# Patient Record
Sex: FEMALE | Race: WHITE | Employment: PART TIME | URBAN - METROPOLITAN AREA
[De-identification: names, ages, dates, MRNs, and addresses within clinical notes are randomized per-mention and may not be internally consistent; named-entity substitution may affect disease eponyms.]

---

## 2023-04-10 LAB
GRAM STAIN: NORMAL
TISSUE/WOUND CULTURE/SMEAR: NORMAL

## 2023-04-27 ENCOUNTER — HOSPITAL ENCOUNTER (OUTPATIENT)
Dept: DATA CONVERSION | Facility: HOSPITAL | Age: 18
End: 2023-04-27
Attending: OTOLARYNGOLOGY | Admitting: OTOLARYNGOLOGY
Payer: COMMERCIAL

## 2023-04-27 DIAGNOSIS — R22.1 LOCALIZED SWELLING, MASS AND LUMP, NECK: ICD-10-CM

## 2023-04-27 DIAGNOSIS — L72.0 EPIDERMAL CYST: ICD-10-CM

## 2023-04-27 DIAGNOSIS — F17.200 NICOTINE DEPENDENCE, UNSPECIFIED, UNCOMPLICATED: ICD-10-CM

## 2023-04-27 DIAGNOSIS — L02.11 CUTANEOUS ABSCESS OF NECK: ICD-10-CM

## 2023-04-27 DIAGNOSIS — L72.8 OTHER FOLLICULAR CYSTS OF THE SKIN AND SUBCUTANEOUS TISSUE: ICD-10-CM

## 2023-04-27 LAB — HCG, URINE: NEGATIVE

## 2023-04-29 LAB
GRAM STAIN: NORMAL
TISSUE/WOUND CULTURE/SMEAR: NORMAL

## 2023-05-04 LAB
COMPLETE PATHOLOGY REPORT: NORMAL
CONVERTED CLINICAL DIAGNOSIS-HISTORY: NORMAL
CONVERTED FINAL DIAGNOSIS: NORMAL
CONVERTED FINAL REPORT PDF LINK TO COPY AND PASTE: NORMAL
CONVERTED GROSS DESCRIPTION: NORMAL

## 2023-05-25 ENCOUNTER — HOSPITAL ENCOUNTER (OUTPATIENT)
Dept: DATA CONVERSION | Facility: HOSPITAL | Age: 18
End: 2023-05-25
Attending: OTOLARYNGOLOGY | Admitting: OTOLARYNGOLOGY
Payer: COMMERCIAL

## 2023-05-25 DIAGNOSIS — Z53.8 PROCEDURE AND TREATMENT NOT CARRIED OUT FOR OTHER REASONS: ICD-10-CM

## 2023-05-25 DIAGNOSIS — R22.1 LOCALIZED SWELLING, MASS AND LUMP, NECK: ICD-10-CM

## 2023-05-25 LAB — HCG, URINE: POSITIVE

## 2023-06-09 LAB
CHLAMYDIA TRACH., AMPLIFIED: NEGATIVE
N. GONORRHEA, AMPLIFIED: NEGATIVE
URINE CULTURE: NORMAL

## 2023-07-05 LAB
ERYTHROCYTE DISTRIBUTION WIDTH (RATIO) BY AUTOMATED COUNT: 14.2 % (ref 11.5–14.5)
ERYTHROCYTE MEAN CORPUSCULAR HEMOGLOBIN CONCENTRATION (G/DL) BY AUTOMATED: 33 G/DL (ref 32–36)
ERYTHROCYTE MEAN CORPUSCULAR VOLUME (FL) BY AUTOMATED COUNT: 85 FL (ref 80–100)
ERYTHROCYTES (10*6/UL) IN BLOOD BY AUTOMATED COUNT: 4.73 X10E12/L (ref 4–5.2)
HEMATOCRIT (%) IN BLOOD BY AUTOMATED COUNT: 40.3 % (ref 36–46)
HEMOGLOBIN (G/DL) IN BLOOD: 13.3 G/DL (ref 12–16)
LEUKOCYTES (10*3/UL) IN BLOOD BY AUTOMATED COUNT: 10 X10E9/L (ref 4.4–11.3)
PLATELETS (10*3/UL) IN BLOOD AUTOMATED COUNT: 282 X10E9/L (ref 150–450)
REFLEX ADDED, ANEMIA PANEL: NORMAL

## 2023-07-06 LAB
ABO GROUP (TYPE) IN BLOOD: NORMAL
ANTIBODY SCREEN: NORMAL
ESTIMATED AVERAGE GLUCOSE FOR HBA1C: 88 MG/DL
HEMOGLOBIN A1C/HEMOGLOBIN TOTAL IN BLOOD: 4.7 %
HEPATITIS B VIRUS SURFACE AG PRESENCE IN SERUM: NONREACTIVE
HIV 1/ 2 AG/AB SCREEN: NONREACTIVE
RH FACTOR: NORMAL
RUBELLA VIRUS IGG AB: NORMAL
SYPHILIS TOTAL AB: NONREACTIVE

## 2023-09-07 VITALS — WEIGHT: 197.31 LBS | BODY MASS INDEX: 33.69 KG/M2 | HEIGHT: 64 IN

## 2023-09-14 NOTE — H&P
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  RESPIRATION: Breathing comfortably, no stridor.  CV: No clubbing/cyanosis/edema in hands.  EYES: EOM Intact, sclera normal.  NEURO: Alert and oriented times 3, Cranial nerves 2-12 intact and symmetric bilaterally.  HEAD AND FACE: Skin with no masses or lesions, sinuses nontender to palpation.  SALIVARY GLANDS: Parotid and submandibular glands normal bilaterally.  EARS: Normal external ears, external auditory canals, and TMs to otoscopy, normal hearing to whispered voice.  NOSE: External nose midline, anterior rhinoscopy is normal with limited visualization to the anterior aspect of the interior turbinates, no lesions noted.  ORAL CAVITY/OROPHARYNX/LIPS: Normal mucous membranes, normal floor of mouth/tongue/OP, no masses or lesions are noted.  NECK/LYMPH: No LAD, no thyroid masses. palpable right neck mass   Respiratory/Thorax: breathing comfortably   Cardiovascular: regular rate     Consent:   COVID-19 Consent:  ·  COVID-19 Risk Consent Surgeon has reviewed key risks related to the risk of julia COVID-19 and if they contract COVID-19 what the risks are.       Electronic Signatures:  Terrance Christy)  (Signed 27-Apr-2023 08:39)   Authored: History of Present Illness, Allergies, Home  Medication Review, Impression/Procedure,  ERAS, Physical Exam, Consent, Note Completion      Last Updated: 27-Apr-2023 08:39 by Terrance Christy)

## 2023-09-15 RX ORDER — ONDANSETRON 4 MG/1
TABLET, FILM COATED ORAL
COMMUNITY
Start: 2022-12-21 | End: 2023-10-23

## 2023-09-15 RX ORDER — SERTRALINE HYDROCHLORIDE 100 MG/1
100 TABLET, FILM COATED ORAL DAILY
COMMUNITY
Start: 2023-04-27 | End: 2023-10-23

## 2023-09-30 NOTE — H&P
History & Physical Reviewed:   Pregnant/Lactating:  ·  Are You Pregnant no   ·  Are You Currently Breastfeeding no     I have reviewed the History and Physical dated:  27-Apr-2023   History and Physical reviewed and relevant findings noted. Patient examined to review pertinent physical  findings.: No significant changes   Home Medications Reviewed: no changes noted   Allergies Reviewed: no changes noted       ERAS (Enhanced Recovery After Surgery):  ·  ERAS Patient: no     Consent:   COVID-19 Consent:  ·  COVID-19 Risk Consent Surgeon has reviewed key risks related to the risk of julia COVID-19 and if they contract COVID-19 what the risks are.       Electronic Signatures:  Terrance Christy)  (Signed 25-May-2023 06:36)   Authored: History & Physical Reviewed, ERAS, Consent,  Note Completion      Last Updated: 25-May-2023 06:36 by Terrance Christy)

## 2023-10-02 NOTE — OP NOTE
Post Operative Note:     PreOp Diagnosis: Right neck cyst   Post-Procedure Diagnosis: Right neck cyst   Procedure: 1.  Incision and drainage of right neck  abscess  2.  Direct laryngoscopy   Surgeon: jil   Resident/Fellow/Other Assistant: [email or web address]   Estimated Blood Loss (mL): 2   Specimen: yes. Culture and biopsy   Findings: Actively infected cystic mass with very  inflamed tissue planes     Operative Report Dictated:  Dictation: not applicable - note contains Operative  Report   Operative Report:    Indications: Patient was referred for evaluation of a cystic right neck mass.  FNA showed squamous epithelial lined cyst.  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  Excision was recommended.  We discussed risks of bleeding, infection, numbness, cranial neuropathies    Procedure: Patient was taken back to the operating room and laid supine on the table.  Timeout was performed, general anesthesia induced, patient was intubated.  The Dedo laryngoscope was used to examine the oropharynx, hypopharynx and larynx.  There  were no mucosal lesions, and no obvious tracts.    An incision was marked on the right neck.  A 15 blade was used to make skin incision down into the subcutaneous fat.  Bovie cautery was used to dissect deep to the platysma.  Purulent fluid was leaking from multiple areas through the cystic wall.  At  first I attempted to develop a plane between the cyst and the platysma as well as the sternocleidomastoid muscle.  The tissue planes were very very inflamed mass was leaking purulent fluid multiple areas after retracting tissue away from it.  Therefore  I decided not to proceed with the excision.  A 15 blade was used to make an incision in the cyst wall and cultures were obtained.  All of the fluid in the cystic mass was evacuated.  The cavity was irrigated with saline.  Biopsies of the cyst wall were  taken with pickups and a 15 blade.  This was sent for pathology.  Hemostasis was adequate.   A Penrose drain was left in the defect and the incision was closed in layers using 3-0 Vicryl for the platysmal layer and a running 5-0 fast for the skin.  The  patient was extubated without issue and taken to PACU in stable condition    Attestation:   Note Completion:  Attending Attestation I was present for key portions of the procedure and the procedure lasted longer than 5 minutes.         Electronic Signatures:  Terrance Christy)  (Signed 27-Apr-2023 11:47)   Authored: Post Operative Note, Note Completion      Last Updated: 27-Apr-2023 11:47 by Terrance Christy)

## 2023-10-21 NOTE — PROGRESS NOTES
Assessment/Plan   17 y.o.  at 27w2d  - GCT/CBC today   - Offered flu shot, pt accepts  - 28 week folder provided and contents explained  - Recommended 30 and 36 week growth ultrasounds due to BMI of 35.4 at first visit, order placed and pt instructed on how to schedule  - Routine prenatal care    Follow up in 2 weeks for next prenatal visit. T&S, Rhogam, and Tdap next visit.    SANTANA Almanza-TERRY Longoriaannette Crooks is a 17 y.o.  at 27w2d with a working estimated date of delivery of 2024, by Last Menstrual Period presenting for a routine prenatal visit. She is doing well, no concerns. She denies vaginal bleeding, leakage of fluid, contractions, or decreased fetal movement.    Her pregnancy is complicated by:  Pregnancy Problems (from 23 to present)       No problems associated with this episode.             Objective   Weight: 107 kg (236 lb 6.4 oz)  TW.6 kg (56 lb 6.4 oz)   Expected Total Weight Gain: 5 kg (11 lb)-9 kg (19 lb)   Pregravid BMI: 30.88  Pregravid Weight: 81.6 kg (180 lb)   BP: 126/74  Fetal Heart Rate: 155 Fundal Height (cm): 27 cm

## 2023-10-23 ENCOUNTER — OFFICE VISIT (OUTPATIENT)
Dept: OTOLARYNGOLOGY | Facility: CLINIC | Age: 18
End: 2023-10-23
Payer: COMMERCIAL

## 2023-10-23 ENCOUNTER — ROUTINE PRENATAL (OUTPATIENT)
Dept: OBSTETRICS AND GYNECOLOGY | Facility: CLINIC | Age: 18
End: 2023-10-23
Payer: COMMERCIAL

## 2023-10-23 VITALS — DIASTOLIC BLOOD PRESSURE: 74 MMHG | BODY MASS INDEX: 40.58 KG/M2 | SYSTOLIC BLOOD PRESSURE: 126 MMHG | WEIGHT: 236.4 LBS

## 2023-10-23 VITALS — BODY MASS INDEX: 40.46 KG/M2 | HEIGHT: 64 IN | WEIGHT: 237 LBS

## 2023-10-23 DIAGNOSIS — Q18.0 CONGENITAL BRANCHIAL CLEFT CYST: Primary | ICD-10-CM

## 2023-10-23 DIAGNOSIS — Z3A.27 27 WEEKS GESTATION OF PREGNANCY (HHS-HCC): Primary | ICD-10-CM

## 2023-10-23 DIAGNOSIS — Z34.02 ENCOUNTER FOR PRENATAL CARE IN SECOND TRIMESTER OF FIRST PREGNANCY (HHS-HCC): ICD-10-CM

## 2023-10-23 DIAGNOSIS — Z13.1 SCREENING FOR DIABETES MELLITUS (DM): ICD-10-CM

## 2023-10-23 DIAGNOSIS — O99.212 OBESITY AFFECTING PREGNANCY IN SECOND TRIMESTER, UNSPECIFIED OBESITY TYPE (HHS-HCC): ICD-10-CM

## 2023-10-23 LAB
ERYTHROCYTE [DISTWIDTH] IN BLOOD BY AUTOMATED COUNT: 12.6 % (ref 11.5–14.5)
GLUCOSE 1H P 50 G GLC PO SERPL-MCNC: 96 MG/DL
HCT VFR BLD AUTO: 36.5 % (ref 36–46)
HGB BLD-MCNC: 12.1 G/DL (ref 12–16)
MCH RBC QN AUTO: 29.4 PG (ref 26–34)
MCHC RBC AUTO-ENTMCNC: 33.2 G/DL (ref 32–36)
MCV RBC AUTO: 89 FL (ref 80–100)
NRBC BLD-RTO: 0 /100 WBCS (ref 0–0)
PLATELET # BLD AUTO: 308 X10*3/UL (ref 150–450)
PMV BLD AUTO: 10.4 FL (ref 7.5–11.5)
RBC # BLD AUTO: 4.12 X10*6/UL (ref 4–5.2)
WBC # BLD AUTO: 12.5 X10*3/UL (ref 4.4–11.3)

## 2023-10-23 PROCEDURE — 90471 IMMUNIZATION ADMIN: CPT

## 2023-10-23 PROCEDURE — 85027 COMPLETE CBC AUTOMATED: CPT

## 2023-10-23 PROCEDURE — 82947 ASSAY GLUCOSE BLOOD QUANT: CPT

## 2023-10-23 PROCEDURE — 0501F PRENATAL FLOW SHEET: CPT

## 2023-10-23 PROCEDURE — 99213 OFFICE O/P EST LOW 20 MIN: CPT | Performed by: OTOLARYNGOLOGY

## 2023-10-23 PROCEDURE — 36415 COLL VENOUS BLD VENIPUNCTURE: CPT

## 2023-10-23 PROCEDURE — 90686 IIV4 VACC NO PRSV 0.5 ML IM: CPT

## 2023-10-23 RX ORDER — DESONIDE 0.5 MG/ML
LOTION TOPICAL 2 TIMES DAILY
COMMUNITY
Start: 2012-11-28 | End: 2023-10-23

## 2023-10-23 RX ORDER — PREDNISONE 20 MG/1
TABLET ORAL
COMMUNITY
Start: 2013-04-04 | End: 2023-10-23

## 2023-10-23 RX ORDER — PENICILLIN V POTASSIUM 500 MG/1
500 TABLET, FILM COATED ORAL EVERY 8 HOURS
COMMUNITY
Start: 2013-04-04 | End: 2023-10-23

## 2023-10-23 RX ORDER — MUPIROCIN 20 MG/G
OINTMENT TOPICAL 3 TIMES DAILY
COMMUNITY
Start: 2012-11-20 | End: 2023-10-23

## 2023-10-23 RX ORDER — TRIPROLIDINE/PSEUDOEPHEDRINE 2.5MG-60MG
150 TABLET ORAL EVERY 8 HOURS PRN
COMMUNITY
Start: 2012-01-04 | End: 2023-10-23

## 2023-10-23 RX ORDER — BUPROPION HYDROCHLORIDE 150 MG/1
150 TABLET ORAL DAILY
COMMUNITY
Start: 2023-06-13 | End: 2023-10-23

## 2023-10-23 RX ORDER — HYDROCORTISONE 25 MG/G
CREAM TOPICAL 2 TIMES DAILY
COMMUNITY
Start: 2012-03-19 | End: 2023-10-23

## 2023-10-23 NOTE — PROGRESS NOTES
ENT Follow up Visit    History Of Present Illness  Kary Crooks is a 17 y.o. female presents for follow up of a branchial cleft cyst.  She was scheduled for reexcision however procedure was canceled the day of the procedure as she found out she was pregnant.  She is due this coming January.  She recently noted she can feel the mass again.  She denies any pain and has not had any significant swelling     Past Medical History  She has no past medical history on file.    Surgical History  She has no past surgical history on file.     Social History  She reports that she has never smoked. She does not have any smokeless tobacco history on file. She reports that she does not currently use alcohol. She reports current drug use. Drug: Marijuana.    Family History  No family history on file.     Allergies  Patient has no known allergies.     Physical Exam:  CONSTITUTIONAL:  No acute distress  VOICE:  No hoarseness or other abnormality  RESPIRATION:  Breathing comfortably, no stridor  CV:  No clubbing/cyanosis/edema in hands  EYES:  EOM intact, sclera normal  NEURO:  Alert and oriented times 3, Cranial nerves II-XII grossly intact and symmetric bilaterally  HEAD AND FACE:  Symmetric facial features, no masses or lesions, sinuses non-tender to palpation  SALIVARY GLANDS:  Parotid and submandibular glands normal bilaterally  EARS:  Normal external ears, external auditory canals, and TMs to otoscopy, normal hearing to whispered voice.  NOSE:  External nose midline, anterior rhinoscopy is normal with limited visualization to the anterior aspect of the interior turbinates, no bleeding or drainage, no lesions  ORAL CAVITY/OROPHARYNX/LIPS:  Normal mucous membranes, normal floor of mouth/tongue/OP, no masses or lesions  PHARYNGEAL WALLS:  No masses or lesions  NECK/LYMPH: Palpable mobile right level 2 neck mass.  No overlying skin changes  SKIN: Incision is well-healed  PSYCH:  Alert and oriented with appropriate mood and  affect         Assessment and Plan  17 y.o. 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  Acute infection was noted at the time of her first procedure therefore incision and drainage was completed.  At the second operation she found out she was pregnant and procedure was canceled    -No sign of infection on today's exam  -Would recommend holding off on excision until after she delivers and is safe from an obgyn perspective  -Can follow-up earlier with any new signs of infection or change in exam  -She will plan to follow-up in the spring, earlier with any issues    Terrance Christy MD

## 2023-10-24 LAB — REFLEX ADDED, ANEMIA PANEL: NORMAL

## 2023-10-31 ENCOUNTER — TELEPHONE (OUTPATIENT)
Dept: OTOLARYNGOLOGY | Facility: CLINIC | Age: 18
End: 2023-10-31
Payer: COMMERCIAL

## 2023-10-31 DIAGNOSIS — R22.1 NECK MASS: ICD-10-CM

## 2023-10-31 RX ORDER — AMOXICILLIN AND CLAVULANATE POTASSIUM 875; 125 MG/1; MG/1
875 TABLET, FILM COATED ORAL 2 TIMES DAILY
Qty: 14 TABLET | Refills: 0 | Status: ON HOLD | OUTPATIENT
Start: 2023-10-31 | End: 2023-11-08

## 2023-10-31 NOTE — TELEPHONE ENCOUNTER
Spoke with Kary to let her know Dr. Christy will be sending in Augmentin x1week. Preferred pharmacy confirmed.

## 2023-11-06 ENCOUNTER — HOSPITAL ENCOUNTER (EMERGENCY)
Facility: HOSPITAL | Age: 18
Discharge: STILL ADMITTED OR ADMINISTRATIVE | End: 2023-11-06
Payer: COMMERCIAL

## 2023-11-06 ENCOUNTER — HOSPITAL ENCOUNTER (OUTPATIENT)
Dept: RADIOLOGY | Facility: HOSPITAL | Age: 18
Discharge: HOME | End: 2023-11-06
Payer: COMMERCIAL

## 2023-11-06 ENCOUNTER — OFFICE VISIT (OUTPATIENT)
Dept: OTOLARYNGOLOGY | Facility: CLINIC | Age: 18
End: 2023-11-06
Payer: COMMERCIAL

## 2023-11-06 VITALS
WEIGHT: 246 LBS | HEIGHT: 64 IN | DIASTOLIC BLOOD PRESSURE: 80 MMHG | BODY MASS INDEX: 42 KG/M2 | SYSTOLIC BLOOD PRESSURE: 124 MMHG | TEMPERATURE: 98.1 F

## 2023-11-06 DIAGNOSIS — R22.1 NECK MASS: ICD-10-CM

## 2023-11-06 PROCEDURE — 76536 US EXAM OF HEAD AND NECK: CPT | Performed by: RADIOLOGY

## 2023-11-06 PROCEDURE — 99213 OFFICE O/P EST LOW 20 MIN: CPT | Performed by: OTOLARYNGOLOGY

## 2023-11-06 PROCEDURE — 76536 US EXAM OF HEAD AND NECK: CPT

## 2023-11-06 PROCEDURE — 4500999001 HC ED NO CHARGE

## 2023-11-06 NOTE — PROGRESS NOTES
ENT Follow up Visit    History Of Present Illness  Kary Crooks is a 17 y.o. female presents for follow up of a branchial cleft cyst.  She was scheduled for reexcision however procedure was canceled the day of the procedure as she found out she was pregnant.  She is due this coming January.  She recently noted she can feel the mass again.  She denies any pain and has not had any significant swelling     Past Medical History  She has no past medical history on file.  There is no problem list on file for this patient.      Surgical History  I&d neck abscess     Social History  She reports that she has never smoked. She does not have any smokeless tobacco history on file. She reports that she does not currently use alcohol. She reports current drug use. Drug: Marijuana.    Family History  No family history on file.     Allergies  Patient has no known allergies.     Physical Exam:  CONSTITUTIONAL:  No acute distress  VOICE:  No hoarseness or other abnormality  RESPIRATION:  Breathing comfortably, no stridor  CV:  No clubbing/cyanosis/edema in hands  EYES:  EOM intact, sclera normal  NEURO:  Alert and oriented times 3, Cranial nerves II-XII grossly intact and symmetric bilaterally  HEAD AND FACE:  Symmetric facial features, no masses or lesions, sinuses non-tender to palpation  SALIVARY GLANDS:  Parotid and submandibular glands normal bilaterally  EARS:  Normal external ears, external auditory canals, and TMs to otoscopy, normal hearing to whispered voice.  NOSE:  External nose midline, anterior rhinoscopy is normal with limited visualization to the anterior aspect of the interior turbinates, no bleeding or drainage, no lesions  ORAL CAVITY/OROPHARYNX/LIPS:  Normal mucous membranes, normal floor of mouth/tongue/OP, no masses or lesions  PHARYNGEAL WALLS:  No masses or lesions  NECK/LYMPH: Palpable mobile right level 2 neck mass.  No overlying skin changes. Area is more swollen and indurated. Tender to touch  SKIN:  Incision is well-healed  PSYCH:  Alert and oriented with appropriate mood and affect    Verbal consent was obtained for attempt at aspiration. The skin was injected with local anesthesia. After that took effect multiple passes were made to attempt aspiration but no fluid was encountered.     Assessment and Plan  17 y.o. 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  Acute infection was noted at the time of her first procedure therefore incision and drainage was completed.  At the second operation she found out she was pregnant and procedure was canceled    -concern for possible early phlegmon  -Will get stat ultrasound to assess for fluid collection  -recheck labs  -may need to consider another I&D if it continues to progress. Will try to avoid general anesthesia as she is 29 weeks pregnant    Terrance Christy MD

## 2023-11-07 ENCOUNTER — HOSPITAL ENCOUNTER (INPATIENT)
Facility: HOSPITAL | Age: 18
LOS: 1 days | Discharge: HOME | DRG: 833 | End: 2023-11-08
Attending: EMERGENCY MEDICINE | Admitting: STUDENT IN AN ORGANIZED HEALTH CARE EDUCATION/TRAINING PROGRAM
Payer: COMMERCIAL

## 2023-11-07 ENCOUNTER — ROUTINE PRENATAL (OUTPATIENT)
Dept: OBSTETRICS AND GYNECOLOGY | Facility: CLINIC | Age: 18
End: 2023-11-07
Payer: COMMERCIAL

## 2023-11-07 ENCOUNTER — LAB (OUTPATIENT)
Dept: LAB | Facility: LAB | Age: 18
End: 2023-11-07
Payer: COMMERCIAL

## 2023-11-07 VITALS
BODY MASS INDEX: 41.43 KG/M2 | WEIGHT: 241.38 LBS | SYSTOLIC BLOOD PRESSURE: 126 MMHG | DIASTOLIC BLOOD PRESSURE: 84 MMHG

## 2023-11-07 DIAGNOSIS — Q18.0 CONGENITAL BRANCHIAL CLEFT CYST: Primary | ICD-10-CM

## 2023-11-07 DIAGNOSIS — R22.1 NECK MASS: ICD-10-CM

## 2023-11-07 DIAGNOSIS — Z3A.29 29 WEEKS GESTATION OF PREGNANCY (HHS-HCC): Primary | ICD-10-CM

## 2023-11-07 DIAGNOSIS — Q18.0 BRANCHIAL CLEFT CYST: ICD-10-CM

## 2023-11-07 LAB
ABO GROUP (TYPE) IN BLOOD: NORMAL
ALBUMIN SERPL BCP-MCNC: 3.4 G/DL (ref 3.4–5)
ALP SERPL-CCNC: 100 U/L (ref 33–110)
ALT SERPL W P-5'-P-CCNC: 9 U/L (ref 7–45)
ANION GAP SERPL CALC-SCNC: 13 MMOL/L (ref 10–20)
ANTIBODY SCREEN: NORMAL
AST SERPL W P-5'-P-CCNC: 9 U/L (ref 9–39)
BILIRUB SERPL-MCNC: 0.6 MG/DL (ref 0–1.2)
BUN SERPL-MCNC: 9 MG/DL (ref 6–23)
CALCIUM SERPL-MCNC: 8.7 MG/DL (ref 8.6–10.3)
CHLORIDE SERPL-SCNC: 103 MMOL/L (ref 98–107)
CO2 SERPL-SCNC: 25 MMOL/L (ref 21–32)
CREAT SERPL-MCNC: 0.51 MG/DL (ref 0.5–1.05)
ERYTHROCYTE [DISTWIDTH] IN BLOOD BY AUTOMATED COUNT: 12.5 % (ref 11.5–14.5)
GFR SERPL CREATININE-BSD FRML MDRD: >90 ML/MIN/1.73M*2
GLUCOSE SERPL-MCNC: 93 MG/DL (ref 74–99)
HCT VFR BLD AUTO: 35.1 % (ref 36–46)
HGB BLD-MCNC: 11.8 G/DL (ref 12–16)
MCH RBC QN AUTO: 28.9 PG (ref 26–34)
MCHC RBC AUTO-ENTMCNC: 33.6 G/DL (ref 32–36)
MCV RBC AUTO: 86 FL (ref 80–100)
NRBC BLD-RTO: 0 /100 WBCS (ref 0–0)
PLATELET # BLD AUTO: 337 X10*3/UL (ref 150–450)
POTASSIUM SERPL-SCNC: 4 MMOL/L (ref 3.5–5.3)
PROT SERPL-MCNC: 6.4 G/DL (ref 6.4–8.2)
RBC # BLD AUTO: 4.08 X10*6/UL (ref 4–5.2)
RH FACTOR (ANTIGEN D): NORMAL
SODIUM SERPL-SCNC: 137 MMOL/L (ref 136–145)
WBC # BLD AUTO: 16 X10*3/UL (ref 4.4–11.3)

## 2023-11-07 PROCEDURE — 90715 TDAP VACCINE 7 YRS/> IM: CPT | Performed by: OBSTETRICS & GYNECOLOGY

## 2023-11-07 PROCEDURE — 86901 BLOOD TYPING SEROLOGIC RH(D): CPT

## 2023-11-07 PROCEDURE — 99284 EMERGENCY DEPT VISIT MOD MDM: CPT

## 2023-11-07 PROCEDURE — 85027 COMPLETE CBC AUTOMATED: CPT

## 2023-11-07 PROCEDURE — 0501F PRENATAL FLOW SHEET: CPT | Performed by: OBSTETRICS & GYNECOLOGY

## 2023-11-07 PROCEDURE — 80053 COMPREHEN METABOLIC PANEL: CPT

## 2023-11-07 PROCEDURE — 90471 IMMUNIZATION ADMIN: CPT | Performed by: OBSTETRICS & GYNECOLOGY

## 2023-11-07 PROCEDURE — 99285 EMERGENCY DEPT VISIT HI MDM: CPT | Performed by: EMERGENCY MEDICINE

## 2023-11-07 PROCEDURE — 86900 BLOOD TYPING SEROLOGIC ABO: CPT

## 2023-11-07 PROCEDURE — 96372 THER/PROPH/DIAG INJ SC/IM: CPT | Performed by: OBSTETRICS & GYNECOLOGY

## 2023-11-07 PROCEDURE — 36415 COLL VENOUS BLD VENIPUNCTURE: CPT

## 2023-11-07 PROCEDURE — 86850 RBC ANTIBODY SCREEN: CPT

## 2023-11-07 ASSESSMENT — COLUMBIA-SUICIDE SEVERITY RATING SCALE - C-SSRS
6. HAVE YOU EVER DONE ANYTHING, STARTED TO DO ANYTHING, OR PREPARED TO DO ANYTHING TO END YOUR LIFE?: NO
1. IN THE PAST MONTH, HAVE YOU WISHED YOU WERE DEAD OR WISHED YOU COULD GO TO SLEEP AND NOT WAKE UP?: NO
2. HAVE YOU ACTUALLY HAD ANY THOUGHTS OF KILLING YOURSELF?: NO

## 2023-11-07 NOTE — PROGRESS NOTES
Patient had T&S, Rhogam and TDAP done today.      Branchial cleft  cyst on her neck. Saw DR Christy attempted drainage.  Pt can have general anesthesia if needed [facility] I will let him know.

## 2023-11-08 ENCOUNTER — ANESTHESIA EVENT (OUTPATIENT)
Dept: OPERATING ROOM | Facility: HOSPITAL | Age: 18
DRG: 833 | End: 2023-11-08
Payer: COMMERCIAL

## 2023-11-08 ENCOUNTER — HOSPITAL ENCOUNTER (INPATIENT)
Facility: HOSPITAL | Age: 18
End: 2023-11-08
Attending: STUDENT IN AN ORGANIZED HEALTH CARE EDUCATION/TRAINING PROGRAM | Admitting: STUDENT IN AN ORGANIZED HEALTH CARE EDUCATION/TRAINING PROGRAM
Payer: COMMERCIAL

## 2023-11-08 ENCOUNTER — ANESTHESIA (OUTPATIENT)
Dept: OPERATING ROOM | Facility: HOSPITAL | Age: 18
DRG: 833 | End: 2023-11-08
Payer: COMMERCIAL

## 2023-11-08 VITALS
OXYGEN SATURATION: 96 % | BODY MASS INDEX: 41.21 KG/M2 | DIASTOLIC BLOOD PRESSURE: 67 MMHG | HEART RATE: 86 BPM | RESPIRATION RATE: 22 BRPM | TEMPERATURE: 97.5 F | WEIGHT: 241.4 LBS | SYSTOLIC BLOOD PRESSURE: 105 MMHG | HEIGHT: 64 IN

## 2023-11-08 PROBLEM — Q18.0 BRANCHIAL CLEFT CYST: Status: ACTIVE | Noted: 2023-11-08

## 2023-11-08 PROBLEM — S93.419A SPRAIN OF CALCANEOFIBULAR LIGAMENT: Status: ACTIVE | Noted: 2019-03-26

## 2023-11-08 PROBLEM — Q18.9 CYST IN NECK AT BIRTH: Status: ACTIVE | Noted: 2023-10-30

## 2023-11-08 PROBLEM — Q18.0 CONGENITAL BRANCHIAL CLEFT CYST: Status: ACTIVE | Noted: 2023-11-07

## 2023-11-08 PROBLEM — R11.2 PONV (POSTOPERATIVE NAUSEA AND VOMITING): Status: ACTIVE | Noted: 2023-11-08

## 2023-11-08 PROBLEM — J30.1 SEASONAL ALLERGIC RHINITIS DUE TO POLLEN: Status: ACTIVE | Noted: 2017-08-25

## 2023-11-08 PROBLEM — Z98.890 PONV (POSTOPERATIVE NAUSEA AND VOMITING): Status: ACTIVE | Noted: 2023-11-08

## 2023-11-08 PROBLEM — E55.9 VITAMIN D DEFICIENCY: Status: ACTIVE | Noted: 2019-08-15

## 2023-11-08 LAB
BILIRUBIN, POC: NEGATIVE
BLOOD URINE, POC: NEGATIVE
CLARITY, POC: CLEAR
COLOR, POC: YELLOW
GLUCOSE URINE, POC: NEGATIVE
KETONES, POC: NEGATIVE
LEUKOCYTE EST, POC: NEGATIVE
NITRITE, POC: NEGATIVE
PH, POC: 6
POC APPEARANCE OF BODY FLUID: NORMAL
SPECIFIC GRAVITY, POC: 1.02
URINE PROTEIN, POC: NEGATIVE
UROBILINOGEN, POC: 0.2

## 2023-11-08 PROCEDURE — 7100000002 HC RECOVERY ROOM TIME - EACH INCREMENTAL 1 MINUTE: Performed by: OTOLARYNGOLOGY

## 2023-11-08 PROCEDURE — 96372 THER/PROPH/DIAG INJ SC/IM: CPT | Performed by: OTOLARYNGOLOGY

## 2023-11-08 PROCEDURE — 3700000002 HC GENERAL ANESTHESIA TIME - EACH INCREMENTAL 1 MINUTE: Performed by: OTOLARYNGOLOGY

## 2023-11-08 PROCEDURE — A42815 PR EXCISION BRANC CLFT CYST,DEEP: Performed by: NURSE ANESTHETIST, CERTIFIED REGISTERED

## 2023-11-08 PROCEDURE — 3600000003 HC OR TIME - INITIAL BASE CHARGE - PROCEDURE LEVEL THREE: Performed by: OTOLARYNGOLOGY

## 2023-11-08 PROCEDURE — 21501 I&D DP ABSC/HMTMA SFT TS NCK: CPT | Performed by: OTOLARYNGOLOGY

## 2023-11-08 PROCEDURE — 3600000008 HC OR TIME - EACH INCREMENTAL 1 MINUTE - PROCEDURE LEVEL THREE: Performed by: OTOLARYNGOLOGY

## 2023-11-08 PROCEDURE — 87102 FUNGUS ISOLATION CULTURE: CPT | Performed by: EMERGENCY MEDICINE

## 2023-11-08 PROCEDURE — 87070 CULTURE OTHR SPECIMN AEROBIC: CPT | Performed by: EMERGENCY MEDICINE

## 2023-11-08 PROCEDURE — 2500000004 HC RX 250 GENERAL PHARMACY W/ HCPCS (ALT 636 FOR OP/ED)

## 2023-11-08 PROCEDURE — 7100000001 HC RECOVERY ROOM TIME - INITIAL BASE CHARGE: Performed by: OTOLARYNGOLOGY

## 2023-11-08 PROCEDURE — 2500000005 HC RX 250 GENERAL PHARMACY W/O HCPCS: Performed by: NURSE ANESTHETIST, CERTIFIED REGISTERED

## 2023-11-08 PROCEDURE — 3700000001 HC GENERAL ANESTHESIA TIME - INITIAL BASE CHARGE: Performed by: OTOLARYNGOLOGY

## 2023-11-08 PROCEDURE — 99222 1ST HOSP IP/OBS MODERATE 55: CPT | Performed by: OTOLARYNGOLOGY

## 2023-11-08 PROCEDURE — 2500000005 HC RX 250 GENERAL PHARMACY W/O HCPCS: Performed by: OTOLARYNGOLOGY

## 2023-11-08 PROCEDURE — 2500000001 HC RX 250 WO HCPCS SELF ADMINISTERED DRUGS (ALT 637 FOR MEDICARE OP)

## 2023-11-08 PROCEDURE — 1210000001 HC SEMI-PRIVATE ROOM DAILY

## 2023-11-08 PROCEDURE — 99214 OFFICE O/P EST MOD 30 MIN: CPT | Mod: 25

## 2023-11-08 PROCEDURE — 2500000004 HC RX 250 GENERAL PHARMACY W/ HCPCS (ALT 636 FOR OP/ED): Performed by: NURSE ANESTHETIST, CERTIFIED REGISTERED

## 2023-11-08 PROCEDURE — 0J940ZZ DRAINAGE OF RIGHT NECK SUBCUTANEOUS TISSUE AND FASCIA, OPEN APPROACH: ICD-10-PCS | Performed by: OTOLARYNGOLOGY

## 2023-11-08 PROCEDURE — A42815 PR EXCISION BRANC CLFT CYST,DEEP: Performed by: ANESTHESIOLOGY

## 2023-11-08 RX ORDER — REMIFENTANIL HYDROCHLORIDE 1 MG/ML
INJECTION, POWDER, LYOPHILIZED, FOR SOLUTION INTRAVENOUS AS NEEDED
Status: DISCONTINUED | OUTPATIENT
Start: 2023-11-08 | End: 2023-11-08

## 2023-11-08 RX ORDER — SIMETHICONE 80 MG
80 TABLET,CHEWABLE ORAL 4 TIMES DAILY PRN
Status: DISCONTINUED | OUTPATIENT
Start: 2023-11-08 | End: 2023-11-08 | Stop reason: HOSPADM

## 2023-11-08 RX ORDER — SODIUM CHLORIDE, SODIUM LACTATE, POTASSIUM CHLORIDE, CALCIUM CHLORIDE 600; 310; 30; 20 MG/100ML; MG/100ML; MG/100ML; MG/100ML
125 INJECTION, SOLUTION INTRAVENOUS CONTINUOUS
Status: DISCONTINUED | OUTPATIENT
Start: 2023-11-08 | End: 2023-11-08 | Stop reason: HOSPADM

## 2023-11-08 RX ORDER — AMOXICILLIN AND CLAVULANATE POTASSIUM 875; 125 MG/1; MG/1
875 TABLET, FILM COATED ORAL 2 TIMES DAILY
Qty: 14 TABLET | Refills: 0 | Status: SHIPPED | OUTPATIENT
Start: 2023-11-08 | End: 2023-11-20 | Stop reason: ALTCHOICE

## 2023-11-08 RX ORDER — BISACODYL 10 MG/1
10 SUPPOSITORY RECTAL DAILY PRN
Status: DISCONTINUED | OUTPATIENT
Start: 2023-11-08 | End: 2023-11-08 | Stop reason: HOSPADM

## 2023-11-08 RX ORDER — AMOXICILLIN AND CLAVULANATE POTASSIUM 875; 125 MG/1; MG/1
875 TABLET, FILM COATED ORAL 2 TIMES DAILY
Qty: 14 TABLET | Refills: 0 | OUTPATIENT
Start: 2023-11-08 | End: 2023-11-15

## 2023-11-08 RX ORDER — FENTANYL CITRATE 50 UG/ML
25 INJECTION, SOLUTION INTRAMUSCULAR; INTRAVENOUS EVERY 5 MIN PRN
Status: DISCONTINUED | OUTPATIENT
Start: 2023-11-08 | End: 2023-11-08 | Stop reason: HOSPADM

## 2023-11-08 RX ORDER — POLYETHYLENE GLYCOL 3350 17 G/17G
17 POWDER, FOR SOLUTION ORAL 2 TIMES DAILY PRN
Status: DISCONTINUED | OUTPATIENT
Start: 2023-11-08 | End: 2023-11-08 | Stop reason: HOSPADM

## 2023-11-08 RX ORDER — LABETALOL HYDROCHLORIDE 5 MG/ML
20 INJECTION, SOLUTION INTRAVENOUS ONCE AS NEEDED
Status: DISCONTINUED | OUTPATIENT
Start: 2023-11-08 | End: 2023-11-08 | Stop reason: HOSPADM

## 2023-11-08 RX ORDER — LIDOCAINE HYDROCHLORIDE 40 MG/ML
INJECTION, SOLUTION RETROBULBAR AS NEEDED
Status: DISCONTINUED | OUTPATIENT
Start: 2023-11-08 | End: 2023-11-08

## 2023-11-08 RX ORDER — LIDOCAINE HYDROCHLORIDE 10 MG/ML
0.5 INJECTION INFILTRATION; PERINEURAL ONCE AS NEEDED
Status: DISCONTINUED | OUTPATIENT
Start: 2023-11-08 | End: 2023-11-08 | Stop reason: HOSPADM

## 2023-11-08 RX ORDER — ONDANSETRON 4 MG/1
4 TABLET, FILM COATED ORAL EVERY 6 HOURS PRN
Status: DISCONTINUED | OUTPATIENT
Start: 2023-11-08 | End: 2023-11-08 | Stop reason: HOSPADM

## 2023-11-08 RX ORDER — ONDANSETRON HYDROCHLORIDE 2 MG/ML
4 INJECTION, SOLUTION INTRAVENOUS ONCE AS NEEDED
Status: DISCONTINUED | OUTPATIENT
Start: 2023-11-08 | End: 2023-11-08 | Stop reason: HOSPADM

## 2023-11-08 RX ORDER — ONDANSETRON HYDROCHLORIDE 2 MG/ML
4 INJECTION, SOLUTION INTRAVENOUS EVERY 6 HOURS PRN
Status: DISCONTINUED | OUTPATIENT
Start: 2023-11-08 | End: 2023-11-08 | Stop reason: HOSPADM

## 2023-11-08 RX ORDER — HYDRALAZINE HYDROCHLORIDE 20 MG/ML
5 INJECTION INTRAMUSCULAR; INTRAVENOUS ONCE AS NEEDED
Status: DISCONTINUED | OUTPATIENT
Start: 2023-11-08 | End: 2023-11-08 | Stop reason: HOSPADM

## 2023-11-08 RX ORDER — FENTANYL CITRATE 50 UG/ML
50 INJECTION, SOLUTION INTRAMUSCULAR; INTRAVENOUS EVERY 5 MIN PRN
Status: DISCONTINUED | OUTPATIENT
Start: 2023-11-08 | End: 2023-11-08 | Stop reason: HOSPADM

## 2023-11-08 RX ORDER — METOCLOPRAMIDE 10 MG/1
10 TABLET ORAL EVERY 6 HOURS PRN
Status: DISCONTINUED | OUTPATIENT
Start: 2023-11-08 | End: 2023-11-08 | Stop reason: HOSPADM

## 2023-11-08 RX ORDER — ACETAMINOPHEN 325 MG/1
650 TABLET ORAL EVERY 6 HOURS
Status: DISCONTINUED | OUTPATIENT
Start: 2023-11-08 | End: 2023-11-08 | Stop reason: HOSPADM

## 2023-11-08 RX ORDER — ACETAMINOPHEN 325 MG/1
650 TABLET ORAL EVERY 6 HOURS PRN
Qty: 28 TABLET | Refills: 0 | Status: SHIPPED | OUTPATIENT
Start: 2023-11-08 | End: 2023-11-20 | Stop reason: ALTCHOICE

## 2023-11-08 RX ORDER — SUCCINYLCHOLINE CHLORIDE 20 MG/ML
INJECTION INTRAMUSCULAR; INTRAVENOUS AS NEEDED
Status: DISCONTINUED | OUTPATIENT
Start: 2023-11-08 | End: 2023-11-08

## 2023-11-08 RX ORDER — ACETAMINOPHEN 325 MG/1
650 TABLET ORAL EVERY 4 HOURS PRN
Status: DISCONTINUED | OUTPATIENT
Start: 2023-11-08 | End: 2023-11-08 | Stop reason: HOSPADM

## 2023-11-08 RX ORDER — HYDROMORPHONE HYDROCHLORIDE 1 MG/ML
0.4 INJECTION, SOLUTION INTRAMUSCULAR; INTRAVENOUS; SUBCUTANEOUS EVERY 2 HOUR PRN
Status: DISCONTINUED | OUTPATIENT
Start: 2023-11-08 | End: 2023-11-08 | Stop reason: HOSPADM

## 2023-11-08 RX ORDER — METOCLOPRAMIDE HYDROCHLORIDE 5 MG/ML
10 INJECTION INTRAMUSCULAR; INTRAVENOUS EVERY 6 HOURS PRN
Status: DISCONTINUED | OUTPATIENT
Start: 2023-11-08 | End: 2023-11-08 | Stop reason: HOSPADM

## 2023-11-08 RX ORDER — LIDOCAINE HYDROCHLORIDE AND EPINEPHRINE 10; 10 MG/ML; UG/ML
INJECTION, SOLUTION INFILTRATION; PERINEURAL AS NEEDED
Status: DISCONTINUED | OUTPATIENT
Start: 2023-11-08 | End: 2023-11-08 | Stop reason: HOSPADM

## 2023-11-08 RX ORDER — ADHESIVE BANDAGE
30 BANDAGE TOPICAL
Status: DISCONTINUED | OUTPATIENT
Start: 2023-11-08 | End: 2023-11-08 | Stop reason: HOSPADM

## 2023-11-08 RX ORDER — PROPOFOL 10 MG/ML
INJECTION, EMULSION INTRAVENOUS AS NEEDED
Status: DISCONTINUED | OUTPATIENT
Start: 2023-11-08 | End: 2023-11-08

## 2023-11-08 RX ORDER — SODIUM CHLORIDE, SODIUM LACTATE, POTASSIUM CHLORIDE, CALCIUM CHLORIDE 600; 310; 30; 20 MG/100ML; MG/100ML; MG/100ML; MG/100ML
50 INJECTION, SOLUTION INTRAVENOUS CONTINUOUS
Status: DISCONTINUED | OUTPATIENT
Start: 2023-11-08 | End: 2023-11-08 | Stop reason: HOSPADM

## 2023-11-08 RX ORDER — NIFEDIPINE 10 MG/1
10 CAPSULE ORAL ONCE AS NEEDED
Status: DISCONTINUED | OUTPATIENT
Start: 2023-11-08 | End: 2023-11-08 | Stop reason: HOSPADM

## 2023-11-08 RX ADMIN — REMIFENTANIL HYDROCHLORIDE 20 MCG: 1 INJECTION, POWDER, LYOPHILIZED, FOR SOLUTION INTRAVENOUS at 14:10

## 2023-11-08 RX ADMIN — PRENATAL VIT W/ FE FUMARATE-FA TAB 27-0.8 MG 1 TABLET: 27-0.8 TAB at 09:24

## 2023-11-08 RX ADMIN — LIDOCAINE HYDROCHLORIDE 3 ML: 40 INJECTION, SOLUTION RETROBULBAR; TOPICAL at 14:05

## 2023-11-08 RX ADMIN — SODIUM CHLORIDE, POTASSIUM CHLORIDE, SODIUM LACTATE AND CALCIUM CHLORIDE 125 ML/HR: 600; 310; 30; 20 INJECTION, SOLUTION INTRAVENOUS at 16:32

## 2023-11-08 RX ADMIN — PROPOFOL 50 MG: 10 INJECTION, EMULSION INTRAVENOUS at 14:08

## 2023-11-08 RX ADMIN — AMPICILLIN SODIUM AND SULBACTAM SODIUM 3 G: 2; 1 INJECTION, POWDER, FOR SOLUTION INTRAMUSCULAR; INTRAVENOUS at 11:05

## 2023-11-08 RX ADMIN — SODIUM CHLORIDE, POTASSIUM CHLORIDE, SODIUM LACTATE AND CALCIUM CHLORIDE 125 ML/HR: 600; 310; 30; 20 INJECTION, SOLUTION INTRAVENOUS at 06:18

## 2023-11-08 RX ADMIN — SUCCINYLCHOLINE CHLORIDE 100 MG: 20 INJECTION, SOLUTION INTRAMUSCULAR; INTRAVENOUS at 14:10

## 2023-11-08 RX ADMIN — ACETAMINOPHEN 650 MG: 325 TABLET ORAL at 17:45

## 2023-11-08 RX ADMIN — REMIFENTANIL HYDROCHLORIDE 40 MCG: 1 INJECTION, POWDER, LYOPHILIZED, FOR SOLUTION INTRAVENOUS at 14:22

## 2023-11-08 RX ADMIN — ACETAMINOPHEN 650 MG: 325 TABLET ORAL at 05:49

## 2023-11-08 RX ADMIN — AMPICILLIN SODIUM AND SULBACTAM SODIUM 3 G: 2; 1 INJECTION, POWDER, FOR SOLUTION INTRAMUSCULAR; INTRAVENOUS at 17:47

## 2023-11-08 RX ADMIN — PROPOFOL 150 MG: 10 INJECTION, EMULSION INTRAVENOUS at 14:05

## 2023-11-08 RX ADMIN — HYDROMORPHONE HYDROCHLORIDE 0.4 MG: 1 INJECTION, SOLUTION INTRAMUSCULAR; INTRAVENOUS; SUBCUTANEOUS at 05:34

## 2023-11-08 RX ADMIN — SODIUM CHLORIDE 0.05 MCG/KG/MIN: 9 INJECTION, SOLUTION INTRAVENOUS at 14:15

## 2023-11-08 RX ADMIN — SODIUM CHLORIDE, POTASSIUM CHLORIDE, SODIUM LACTATE AND CALCIUM CHLORIDE 1000 ML: 600; 310; 30; 20 INJECTION, SOLUTION INTRAVENOUS at 01:48

## 2023-11-08 RX ADMIN — ONDANSETRON 4 MG: 2 INJECTION INTRAMUSCULAR; INTRAVENOUS at 14:34

## 2023-11-08 RX ADMIN — ACETAMINOPHEN 650 MG: 325 TABLET ORAL at 11:08

## 2023-11-08 RX ADMIN — SODIUM CHLORIDE, SODIUM LACTATE, POTASSIUM CHLORIDE, AND CALCIUM CHLORIDE: 600; 310; 30; 20 INJECTION, SOLUTION INTRAVENOUS at 13:53

## 2023-11-08 RX ADMIN — AMPICILLIN SODIUM AND SULBACTAM SODIUM 3 G: 2; 1 INJECTION, POWDER, FOR SOLUTION INTRAMUSCULAR; INTRAVENOUS at 06:48

## 2023-11-08 RX ADMIN — SUCCINYLCHOLINE CHLORIDE 100 MG: 20 INJECTION, SOLUTION INTRAMUSCULAR; INTRAVENOUS at 14:05

## 2023-11-08 SDOH — SOCIAL STABILITY: SOCIAL INSECURITY: HAVE YOU HAD THOUGHTS OF HARMING ANYONE ELSE?: NO

## 2023-11-08 SDOH — SOCIAL STABILITY: SOCIAL INSECURITY: DOES ANYONE TRY TO KEEP YOU FROM HAVING/CONTACTING OTHER FRIENDS OR DOING THINGS OUTSIDE YOUR HOME?: NO

## 2023-11-08 SDOH — HEALTH STABILITY: MENTAL HEALTH: HAVE YOU USED ANY SUBSTANCES (CANABIS, COCAINE, HEROIN, HALLUCINOGENS, INHALANTS, ETC.) IN THE PAST 12 MONTHS?: NO

## 2023-11-08 SDOH — SOCIAL STABILITY: SOCIAL INSECURITY: DO YOU FEEL ANYONE HAS EXPLOITED OR TAKEN ADVANTAGE OF YOU FINANCIALLY OR OF YOUR PERSONAL PROPERTY?: NO

## 2023-11-08 SDOH — HEALTH STABILITY: MENTAL HEALTH: SUICIDAL BEHAVIOR (LIFETIME): NO

## 2023-11-08 SDOH — SOCIAL STABILITY: SOCIAL INSECURITY: VERBAL ABUSE: DENIES

## 2023-11-08 SDOH — SOCIAL STABILITY: SOCIAL INSECURITY: ARE YOU OR HAVE YOU BEEN THREATENED OR ABUSED PHYSICALLY, EMOTIONALLY, OR SEXUALLY BY ANYONE?: NO

## 2023-11-08 SDOH — HEALTH STABILITY: MENTAL HEALTH: HAVE YOU USED ANY PRESCRIPTION DRUGS OTHER THAN PRESCRIBED IN THE PAST 12 MONTHS?: NO

## 2023-11-08 SDOH — SOCIAL STABILITY: SOCIAL INSECURITY: HAS ANYONE EVER THREATENED TO HURT YOUR FAMILY OR YOUR PETS?: NO

## 2023-11-08 SDOH — HEALTH STABILITY: MENTAL HEALTH: NON-SPECIFIC ACTIVE SUICIDAL THOUGHTS (PAST 1 MONTH): NO

## 2023-11-08 SDOH — HEALTH STABILITY: MENTAL HEALTH: WISH TO BE DEAD (PAST 1 MONTH): NO

## 2023-11-08 SDOH — HEALTH STABILITY: MENTAL HEALTH: WERE YOU ABLE TO COMPLETE ALL THE BEHAVIORAL HEALTH SCREENINGS?: YES

## 2023-11-08 SDOH — ECONOMIC STABILITY: HOUSING INSECURITY: DO YOU FEEL UNSAFE GOING BACK TO THE PLACE WHERE YOU ARE LIVING?: NO

## 2023-11-08 SDOH — SOCIAL STABILITY: SOCIAL INSECURITY: PHYSICAL ABUSE: DENIES

## 2023-11-08 SDOH — SOCIAL STABILITY: SOCIAL INSECURITY: ARE THERE ANY APPARENT SIGNS OF INJURIES/BEHAVIORS THAT COULD BE RELATED TO ABUSE/NEGLECT?: NO

## 2023-11-08 SDOH — SOCIAL STABILITY: SOCIAL INSECURITY: ABUSE SCREEN: ADULT

## 2023-11-08 ASSESSMENT — LIFESTYLE VARIABLES
EVER HAD A DRINK FIRST THING IN THE MORNING TO STEADY YOUR NERVES TO GET RID OF A HANGOVER: NO
REASON UNABLE TO ASSESS: NO
AUDIT-C TOTAL SCORE: 0
HAVE PEOPLE ANNOYED YOU BY CRITICIZING YOUR DRINKING: NO
EVER FELT BAD OR GUILTY ABOUT YOUR DRINKING: NO
SKIP TO QUESTIONS 9-10: 1
HOW OFTEN DO YOU HAVE A DRINK CONTAINING ALCOHOL: NEVER
HOW MANY STANDARD DRINKS CONTAINING ALCOHOL DO YOU HAVE ON A TYPICAL DAY: PATIENT DOES NOT DRINK
HAVE YOU EVER FELT YOU SHOULD CUT DOWN ON YOUR DRINKING: NO
AUDIT-C TOTAL SCORE: 0
HOW OFTEN DO YOU HAVE 6 OR MORE DRINKS ON ONE OCCASION: NEVER

## 2023-11-08 ASSESSMENT — PAIN SCALES - GENERAL
PAINLEVEL_OUTOF10: 0 - NO PAIN
PAINLEVEL_OUTOF10: 2
PAINLEVEL_OUTOF10: 2
PAINLEVEL_OUTOF10: 0 - NO PAIN
PAINLEVEL_OUTOF10: 2
PAINLEVEL_OUTOF10: 0 - NO PAIN
PAINLEVEL_OUTOF10: 5 - MODERATE PAIN
PAINLEVEL_OUTOF10: 2
PAIN_LEVEL: 0
PAINLEVEL_OUTOF10: 5 - MODERATE PAIN

## 2023-11-08 ASSESSMENT — PATIENT HEALTH QUESTIONNAIRE - PHQ9
2. FEELING DOWN, DEPRESSED OR HOPELESS: NOT AT ALL
1. LITTLE INTEREST OR PLEASURE IN DOING THINGS: NOT AT ALL
SUM OF ALL RESPONSES TO PHQ9 QUESTIONS 1 & 2: 0

## 2023-11-08 ASSESSMENT — PAIN - FUNCTIONAL ASSESSMENT
PAIN_FUNCTIONAL_ASSESSMENT: 0-10

## 2023-11-08 ASSESSMENT — COLUMBIA-SUICIDE SEVERITY RATING SCALE - C-SSRS
1. IN THE PAST MONTH, HAVE YOU WISHED YOU WERE DEAD OR WISHED YOU COULD GO TO SLEEP AND NOT WAKE UP?: NO
2. HAVE YOU ACTUALLY HAD ANY THOUGHTS OF KILLING YOURSELF?: NO
6. HAVE YOU EVER DONE ANYTHING, STARTED TO DO ANYTHING, OR PREPARED TO DO ANYTHING TO END YOUR LIFE?: NO

## 2023-11-08 NOTE — ED PROVIDER NOTES
HPI   Chief Complaint   Patient presents with    Neck Pain       17-year-old female with history of branchial cleft cyst on the right side s/p partial resection in June 2023 presents for chief complaint of worsening brachial cleft cyst and failed outpatient treatment.  She is pregnant approximately 29 weeks.  G1, P0.  Taking prenatal vitamins and occasional Tylenol as needed for pain.  Noticed swelling in the right submandibular area approximately 9 days ago.  She finished Augmentin 7-day course but still endorsed pain and worsening swelling.  WBC increased from 12-16.  Patient endorses worsening pain despite lack of injury.  They attempted to needle aspirate the cyst in the office per patient but without success.  They advised her to come in today for further work-up. Ultrasound of the neck on 11/6 showed 3.4 x 2.8 x 5.3 cm cystic mass in the right upper neck, deep to the sternocleidomastoid and superficial to the carotid sheath, mildly increased in size compared to 3/17.  The lesion contains a single septation, nonspecific.  No internal complexity or surrounding hypervascularity to suggest superimposed infection.  Type and screen and preop labs performed on 11/7 around 1745.  Patient denies any current chills or myalgia.  Denies difficulty speaking, swallowing, or breathing.  States that she feels that she is speaking with her normal phonation.  Denies any chest pain or dyspnea.  Denies nausea, vomiting, or changes in urination/bowel movement.                          No data recorded                Patient History   History reviewed. No pertinent past medical history.  History reviewed. No pertinent surgical history.  No family history on file.  Social History     Tobacco Use    Smoking status: Never    Smokeless tobacco: Not on file   Vaping Use    Vaping Use: Unknown   Substance Use Topics    Alcohol use: Not Currently    Drug use: Yes     Types: Marijuana       Physical Exam   ED Triage Vitals [11/07/23 2214]    Temp Heart Rate Resp BP   36.3 °C (97.3 °F) (!) 113 16 107/64      SpO2 Temp src Heart Rate Source Patient Position   99 % -- -- --      BP Location FiO2 (%)     -- --       Physical Exam  Vitals and nursing note reviewed.   Constitutional:       General: She is not in acute distress.     Appearance: She is well-developed.   HENT:      Head: Normocephalic and atraumatic.   Eyes:      Conjunctiva/sclera: Conjunctivae normal.   Neck:      Comments: Right submandibular swelling with induration and no palpable fluctuance.  Tender to palpation.  Oropharynx patent with midline uvula and normal phonation.  No sublingual crepitus.  No tracheal deviation.  No stridor or adventitious lung sounds.  Cardiovascular:      Rate and Rhythm: Normal rate and regular rhythm.      Heart sounds: No murmur heard.  Pulmonary:      Effort: Pulmonary effort is normal. No respiratory distress.      Breath sounds: Normal breath sounds.   Abdominal:      Palpations: Abdomen is soft.      Tenderness: There is no abdominal tenderness.   Musculoskeletal:         General: No swelling.      Cervical back: Neck supple.   Skin:     General: Skin is warm and dry.      Capillary Refill: Capillary refill takes less than 2 seconds.   Neurological:      Mental Status: She is alert.   Psychiatric:         Mood and Affect: Mood normal.         ED Course & MDM        Medical Decision Making  Vital signs reviewed, significant for tachycardia at 113 bpm.  Given LR bolus.  Otherwise unremarkable.  Patient is well-appearing and in no apparent distress.  Speaks in full sentences without difficulty.  Patient was made NPO.  ENT was consulted.  Please see their note for full details and recommendations.  They recommend making the patient n.p.o. and calling OB/GYN, as the will likely plan to do surgery later in the day today.  They will come down and assess the patient further at this point.  OB/GYN was also called and will further assess the patient as well.   Patient remained calm and cooperative and in stable condition for the ration of my shift.  Handed off to oncoming provider pending ENT and OB/GYN recommendations.        Procedure  Procedures     Ismael Del Real, APRN-CNP  11/08/23 0139

## 2023-11-08 NOTE — CONSULTS
Reason For Consult  Infected branchial cleft cyst    Consulting provider: Dr. Sosa, ED    History Of Present Illness  Kary Crooks is a 17 y.o. female currently 29 weeks pregnant with history of branchial cleft cyst now presenting with increased neck pain with concern for infection.  She was seen by Dr. Zambrano on 11/6 at which time needle aspiration was attempted however was without return of aspirate.  She recently completed a 1 week course of Augmentin and continues to have failure to improve.  Ultrasound neck performed showing 3 x 3 x 5 cm right cystic neck mass without surrounding hypervascularity.  The patient has been followed for known branchial cleft cyst on an outpatient basis and was originally scheduled for surgical removal however she was found to be pregnant at that time and surgery was thus canceled.  She presents today for admission for IV antibiotics and surgical removal and drainage of cyst with Dr. Zambrano.  On arrival heart rate 116, WBC 16.     Past Medical History  She has no past medical history on file.  Current pregnancy    Surgical History  She has no past surgical history on file.     Social History  She reports that she has never smoked. She does not have any smokeless tobacco history on file. She reports that she does not currently use alcohol. She reports current drug use. Drug: Marijuana.    Family History  No family history on file.     Allergies  Patient has no known allergies.    Review of Systems  ROS negative other than as stated above     Physical Exam  .Physical Exam:  CONSTITUTIONAL:  No acute distress  VOICE:  No hoarseness or other abnormality  RESPIRATION:  Breathing comfortably, no stridor  CV:  No clubbing/cyanosis/edema in hands  EYES:  EOM intact, sclera normal  NEURO:  Alert and oriented times 3, Cranial nerves II-XII grossly intact and symmetric bilaterally  HEAD AND FACE:  Symmetric facial features, no masses or lesions, sinuses non-tender to palpation  SALIVARY  "GLANDS:  Parotid and submandibular glands normal bilaterally  EARS:  Normal external ears, external auditory canals, and TMs to otoscopy, normal hearing to whispered voice.  NOSE:  External nose midline, anterior rhinoscopy is normal with limited visualization to the anterior aspect of the interior turbinates, no bleeding or drainage, no lesions  ORAL CAVITY/OROPHARYNX/LIPS:  Normal mucous membranes, normal floor of mouth/tongue/OP, no masses or lesions  PHARYNGEAL WALLS:  No masses or lesions  NECK/LYMPH: Palpable mobile right level 2 neck mass with minimal overlying skin changes, skin is indurated and pamela to the touch, neck range of motion intact, no active drainage  SKIN:  Neck skin is without scar or injury  PSYCH:  Alert and oriented with appropriate mood and affect       Last Recorded Vitals  Blood pressure 99/64, pulse 81, temperature 36 °C (96.8 °F), temperature source Temporal, resp. rate 20, height 1.626 m (5' 4\"), weight 110 kg (241 lb 6.5 oz), last menstrual period 04/15/2023, SpO2 97 %.    Relevant Results  Neck US 11/6  IMPRESSION:  3.4 x 2.8 x 5.3 cm cystic mass in the right upper neck, deep to the  sternocleidomastoid and superficial to the carotid sheath, mildly  increased in size compared to 03/17/2023. The lesion contains a  single septation, nonspecific. No internal complexity or surrounding  hypervascularity to suggest superimposed infection, however, if there  is clinical concern for infection/abscess, fluid sampling should be  considered.       Assessment/Plan     17-year-old female currently 29 weeks pregnant presenting with infected branchial cleft cyst which has been refractory to p.o. antibiotics.  She presents today at the recommendation of Dr. Christy for operative neck I&D at [facility] where she can also be followed by OB/GYN.    -N.p.o. at midnight  -Begin Unasyn  -Agree with admission to OB/GYN for prenatal care and presurgical clearance  - Added on to OR 11/8 with Dr. Christy  - " consented    Patient seen and discussed with Dr. Jaelyn Colon, PGY2  Otolaryngology - Head & Neck Surgery  ENT Consult pager: [phone]  Peds pager: [phone]  Adult Head & Neck Phone: [phone]  ENT subspecialty team: Radha individual resident who wrote today's note  Please page if urgent    I am the day consult resident. I can only be contacted from 6am to 5pm M-F. Page on weekends or off hours.

## 2023-11-08 NOTE — DISCHARGE INSTRUCTIONS
A few days after your discharge, one of our care coordinators may be calling you to see how things have been going at home. This phone call also gives you the opportunity to clarify any information on your discharge instructions or ask any questions that may have come up since leaving the hospital.     The ENT team will call you tomorrow to discuss your surgery and arrange for follow-up in 3-5 days. Please see your regular OB provider in the next 1-2 weeks.       Care for your Penrose drain:  - Penrose drain care involves changing the dressing. The dressing includes a piece of gauze on your skin that collects the fluid that seeps from the drain and a piece of gauze that covers the skin.  - To change your dressin. Wash hands thoroughly   2. Gently remove the tape and old gauze (take care to not pull on the drain). Take note of how the wound looks and how much fluid is on the gauze. (Is it just a little wet? Moderately wet? Fully saturated? What color is the fluid? Does it have an odor? Take notes to share with your provider.)   3. Re-wash your hands and wash the wound and drain. Use soap, water and a washcloth to clean under and around the drain. Rinse and pat dry with a clean towel.   4. Slide the clean gauze underneath the drain so it's flat on your skin. Place a piece of gauze on top of the drain and tape it.   - Change your dressing at least twice a day or otherwise instructed by your provider. You'll need to change the dressing if it comes loose or gets too wet. Keeping your skin dry reduces your risk of infection.   - When to call your provider: Call your provider if your drain slips or comes loose. Call your provider immediately if you notice any signs of infection, including:   - A fever of 100.4 degrees Farenheit or more  - Redness, swelling, warmth or increased pain at the site.  - Red streaks coming from the site  - Drainage from the site that's smelly, green or thick (uninfected drainage usually  starts off red, fades to pink, pale yellow and then clear)

## 2023-11-08 NOTE — CONSULTS
MFM Consult    Reason for Consult: branchial cyst for procedure with ENT    HPI:  16yo F at 29w4d by LMP c/w 20wk US presenting with known branchial cleft cyst, now with suspicion for infection .     Pt follows with ENT for known branchial cleft cyst, with worsening neck and head pain and enlarging cyst since 10/31. She underwent attempted I&D with scant aspiration on , now s/p 1wk course of augmentin without any improvement in symptoms. US neck on  showed 3.4 x 2.8 x 5.3cm R cystic neck mass. On arrival to the ED, she was tachy to 113, WBC 16, otherwise VS wnl.      Upon arrival to [facility], pt reports persistent pain, primarily severe headache and earache associated with enlarged cyst. Denies difficulty swallowing, speaking, breathing. Reports occasional nausea. Denies fevers, chills. Denies ctx, VB, LOF. Feeling good FM.      Pregnancy c/b  - branchial cleft cyst - underwent partial I&D 2023 with initial improvement in symptoms. Follows with ENT  - Rh neg, s/p rhogam    Obstetrical History   OB History          1    Para   0    Term   0       0    AB   0    Living   0         SAB   0    IAB   0    Ectopic   0    Multiple   0    Live Births   0                 Past Medical History  History reviewed. No pertinent past medical history.     Past Surgical History   History reviewed. No pertinent surgical history.    Social History  Social History     Socioeconomic History    Marital status: Single     Spouse name: Bebo Mcbride    Number of children: Not on file    Years of education: Not on file    Highest education level: Not on file   Occupational History    Not on file   Tobacco Use    Smoking status: Never    Smokeless tobacco: Not on file   Vaping Use    Vaping Use: Unknown   Substance and Sexual Activity    Alcohol use: Not Currently    Drug use: Yes     Types: Marijuana    Sexual activity: Yes     Partners: Male   Other Topics Concern    Not on file   Social History Narrative    Not on  "file     Social Determinants of Health     Financial Resource Strain: Not on file   Food Insecurity: Not on file   Transportation Needs: Not on file   Physical Activity: Not on file   Stress: Not on file   Social Connections: Not on file   Intimate Partner Violence: Not on file       Allergies  No Known Allergies    Medications  Facility-Administered Medications Prior to Admission   Medication Dose Route Frequency Provider Last Rate Last Admin    [COMPLETED] Rho(D) immune globulin (RHOGAM) injection 300 mcg  300 mcg intramuscular Once Shi Zuniga MD   300 mcg at 23 1629     Medications Prior to Admission   Medication Sig Dispense Refill Last Dose    [] amoxicillin-pot clavulanate (Augmentin) 875-125 mg tablet Take 1 tablet (875 mg) by mouth 2 times a day for 7 days. 14 tablet 0     PNV85-iron-folic acid-dha-fish 40-10-1-300 mg capsule Take 1 capsule by mouth once daily.   2023       OBJECTIVE:   /66   Pulse 84   Temp 37.3 °C (99.1 °F) (Temporal)   Resp 18   Ht 1.626 m (5' 4\")   Wt 110 kg (241 lb 6.5 oz)   LMP 04/15/2023   SpO2 96%   BMI 41.44 kg/m²    Temp  Min: 36.3 °C (97.3 °F)  Max: 37.3 °C (99.1 °F)  Pulse  Min: 84  Max: 113  BP  Min: 100/66  Max: 126/84    Physical exam:  General:  AAOx3, No acute distress  HEENT: large area of swelling on R jaw, tender to palpation  Cardiovascular: Warm and well perfused  Respiratory: Normal respiratory effort   Abdominal:  Soft, gravid, non-tender  Extremities: Warm, well perfused, no edema, no calf tenderness     NST: Baseline 135, accelerations +, no decelerations, mod variability   Alix: rare CTX     Labs:   No results found for: \"ABO\", \"LABRH\", \"ABSCRN\"  No results found for: \"WBC\", \"HGB\", \"HCT\", \"PLT\"  No results found for: \"GLUCOSE\", \"NA\", \"K\", \"CL\", \"CO2\", \"ANIONGAP\", \"BUN\", \"CREATININE\", \"EGFR\", \"CALCIUM\", \"ALBUMIN\", \"PROT\", \"ALKPHOS\", \"ALT\", \"AST\", \"BILITOT\"  No results found for: \"APTT\", \"PROTIME\", \"INR\"    ASSESSMENT AND " PLAN:     16yo F at 29w4d by LMP c/w 20wk US presenting with known branchial cleft cyst, now with suspicion for infection .     Branchial cleft cyst, suspicion for infection  -  3.4 x 2.8 x 5.3cm R cystic neck mass on US ()   - WBC 12.5 (10/23) --> 16 ()  - added to OR with ENT   - We reviewed that surgery is typically considered safe during pregnancy. We discussed that surgery during pregnancy has been associated with an increased risk of pregnancy loss, or  labor.  However the available data is limited, and we discussed that these risks are likely lower in the setting of non-abdominal surgery.  In her circumstance where she would be to undergo planned, non-abdominal, scheduled surgery I would expect these risks, while not absent, to be quite low. Furthermore the risks of deferring surgery must be weighed against these risks. Pt expressed understanding of the above.   - plan for fetal heart doppler pre and postop   - tylenol q6h scheduled with dilaudid 0.4mg q2h PRN ordered for pain control  - currently NPO since midnight  - T&S, coags, CBC reviewed     Fetal status: Reassuring, NST reactive on , continue daily NSTs while inpatient   - anatomy scan  wnl    Dispo: Inpatient pending procedure with ENT    Pt seen and discussed with [facility] Attending, Dr. Mann.     Valentine Manzano MD, PGY-3   [facility]  Pager [phone]     Principal Problem:    Congenital branchial cleft cyst  Active Problems:    Branchial cleft cyst

## 2023-11-08 NOTE — H&P
Obstetrical Admission History and Physical     Kary Crooks is a 17 y.o.  at 29.4wga by LMP presenting for enlarged branchial cleft cyst, concerning for infection.     Chief Complaint: Neck Pain and cyst on neck    Assessment/Plan    16yo F at 29w4d by LMP c/w 20wk US presenting with known branchial cleft cyst, now with suspicion for infection .    Branchial cleft cyst, suspicion for infection  -  3.4 x 2.8 x 5.3cm R cystic neck mass on US ()   - WBC 12.5 (10/23) --> 16 ()  - added to OR with ENT   - We reviewed that surgery is typically considered safe during pregnancy. We discussed that surgery during pregnancy has been associated with an increased risk of pregnancy loss, or  labor.  However the available data is limited, and we discussed that these risks are likely lower in the setting of non-abdominal surgery.  In her circumstance where she would be to undergo planned, non-abdominal, scheduled surgery I would expect these risks, while not absent, to be quite low. Furthermore the risks of deferring surgery must be weighed against these risks. Pt expressed understanding of the above.   - plan for fetal heart doppler pre and postop   - tylenol q6h scheduled with dilaudid 0.4mg q2h PRN ordered for pain control  - currently NPO since midnight  - T&S, coags, CBC reviewed    IUP @29.4wga  - NST reactive in triage  - cephalic on admission   - anatomy scan  wnl    Seen and d/w Dr. Bartolo Ledesma MD  PGY2, Obstetrics and Gynecology     Principal Problem:    Congenital branchial cleft cyst  Active Problems:    Branchial cleft cyst      Pregnancy Problems (from 23 to present)       No problems associated with this episode.          Subjective   16yo F at 29w4d by LMP c/w 20wk US presenting with known branchial cleft cyst, now with suspicion for infection .    Pt follows with ENT for known branchial cleft cyst, with worsening neck and head pain and enlarging cyst since 10/31. She  underwent attempted I&D with scant aspiration on , now s/p 1wk course of augmentin without any improvement in symptoms. US neck on  showed 3.4 x 2.8 x 5.3cm R cystic neck mass. On arrival to the ED, she was tachy to 113, WBC 16, otherwise VS wnl.     Upon arrival to [facility], pt reports persistent pain, primarily severe headache and earache associated with enlarged cyst. Denies difficulty swallowing, speaking, breathing. Reports occasional nausea. Denies fevers, chills. Denies ctx, VB, LOF. Feeling good FM.     Pregnancy c/b  - branchial cleft cyst - underwent partial I&D 2023 with initial improvement in symptoms. Follows with ENT  - Rh neg, s/p rhogam       Obstetrical History   OB History    Para Term  AB Living   1 0 0 0 0 0   SAB IAB Ectopic Multiple Live Births   0 0 0 0 0      # Outcome Date GA Lbr Miky/2nd Weight Sex Delivery Anes PTL Lv   1 Current                Past Medical History  History reviewed. No pertinent past medical history.     Past Surgical History   History reviewed. No pertinent surgical history.    Social History  Social History     Tobacco Use    Smoking status: Never    Smokeless tobacco: Not on file   Substance Use Topics    Alcohol use: Not Currently     Substance and Sexual Activity   Drug Use Yes    Types: Marijuana       Allergies  Patient has no known allergies.     Medications  Facility-Administered Medications Prior to Admission   Medication Dose Route Frequency Provider Last Rate Last Admin    [COMPLETED] Rho(D) immune globulin (RHOGAM) injection 300 mcg  300 mcg intramuscular Once Shi Zuniga MD   300 mcg at 23 1629     Medications Prior to Admission   Medication Sig Dispense Refill Last Dose    [] amoxicillin-pot clavulanate (Augmentin) 875-125 mg tablet Take 1 tablet (875 mg) by mouth 2 times a day for 7 days. 14 tablet 0     PNV85-iron-folic acid-dha-fish 40-10-1-300 mg capsule Take 1 capsule by mouth once daily.   2023        Objective    Last Vitals  Temp Pulse Resp BP MAP O2 Sat   36.5 °C (97.7 °F) 88 16 120/63   99 %     Physical Examination  General: somewhat uncomfortable appearing, in no acute distress  HEENT: 4x5 area of swelling along R jaw, exquisitely tender to palpation  Heart: warm and well perfused  Lungs: breathing comfortably on room air   Abdomen: gravid, soft, nontender   Extremities: moving all extremities  Neuro: awake and conversant  Psych: appropriate mood and affect     Lab Review  Lab Results   Component Value Date    WBC 16.0 (H) 11/07/2023    HGB 11.8 (L) 11/07/2023    HCT 35.1 (L) 11/07/2023    MCV 86 11/07/2023     11/07/2023      Lab Results   Component Value Date    GLUCOSE 93 11/07/2023    CALCIUM 8.7 11/07/2023     11/07/2023    K 4.0 11/07/2023    CO2 25 11/07/2023     11/07/2023    BUN 9 11/07/2023    CREATININE 0.51 11/07/2023

## 2023-11-08 NOTE — ANESTHESIA POSTPROCEDURE EVALUATION
Patient: Kary Crooks    Procedure Summary       Date: 11/08/23 Room / Location: [facility] OR 05 / Virtual [facility] [place] OR    Anesthesia Start: 1356 Anesthesia Stop: 1455    Procedure: Excision Cyst Branchial Cleft (Right) Diagnosis:       Congenital branchial cleft cyst      (Congenital branchial cleft cyst [Q18.0])    Surgeons: Terrance Christy MD Responsible Provider: Jie Chandra MD    Anesthesia Type: general ASA Status: 2            Anesthesia Type: general    Vitals Value Taken Time   /59 11/08/23 1500   Temp 36.8 °C (98.2 °F) 11/08/23 1450   Pulse 88 11/08/23 1506   Resp 20 11/08/23 1506   SpO2 96 % 11/08/23 1506   Vitals shown include unvalidated device data.    Anesthesia Post Evaluation    Patient location during evaluation: PACU  Patient participation: complete - patient participated  Level of consciousness: awake  Pain score: 0  Pain management: adequate  Airway patency: patent  Cardiovascular status: acceptable  Respiratory status: acceptable  Hydration status: acceptable        No notable events documented.

## 2023-11-08 NOTE — BRIEF OP NOTE
Date: 2023  OR Location: [facility] OR    Name: Kary Crooks, : 2005, Age: 17 y.o., MRN: [id number], Sex: female    Diagnosis  Pre-op Diagnosis     * Congenital branchial cleft cyst [Q18.0] Post-op Diagnosis     * Congenital branchial cleft cyst [Q18.0]     Procedures  Excision Cyst Branchial Cleft  92995 - CO EXC BRANCHIAL CLEFT CYST CONFINED SKN&SUBQ TIS      Surgeons      * Terrance Christy - Primary    Resident/Fellow/Other Assistant:  Surgeon(s) and Role: Jenny Sharma - resident     Procedure Summary  Anesthesia: Consult  ASA: II  Anesthesia Staff: Anesthesiologist: Jie Chandra MD  CRNA: SANTANA Marin-CRNA  Estimated Blood Loss: 2mL  Intra-op Medications:   Medication Name Total Dose   lidocaine-epinephrine (Xylocaine W/EPI) 1 %-1:100,000 injection 7 mL              Anesthesia Record               Intraprocedure I/O Totals          Intake    Remifentanil Drip 0.00 mL    The total shown is the total volume documented since Anesthesia Start was filed.    Propofol Drip 0.00 mL    The total shown is the total volume documented since Anesthesia Start was filed.    Total Intake 0 mL          Specimen:   ID Type Source Tests Collected by Time   A : RIGHT NECK ABSCESS Swab ABSCESS FUNGAL CULTURE/SMEAR, TISSUE/WOUND CULTURE/SMEAR Terrance Christy MD 2023 [id number]        Staff:   Circulator: Melissa Pham RN  Relief Scrub: Alycia Valencia RN  Scrub Person: Emmie Solis          Findings: expressed approx 10 mL of white murky fluid, cultures taken     Complications:  None; patient tolerated the procedure well.     Disposition: PACU - hemodynamically stable.  Condition: stable  Specimens Collected:   ID Type Source Tests Collected by Time   A : RIGHT NECK ABSCESS Swab ABSCESS FUNGAL CULTURE/SMEAR, TISSUE/WOUND CULTURE/SMEAR Terrance Christy MD 2023 1430     Attending Attestation:     Terrance Christy  Phone Number: [phone]

## 2023-11-08 NOTE — DISCHARGE SUMMARY
Discharge Summary    Admission Date: 2023  Discharge Date: 2023    Discharge Diagnosis  Congenital branchial cleft cyst    Hospital Course  16yo  admitted at 29.3wga for enlarged branchial cleft cyst, suspicious for infection. She underwent an uncomplicated excision of her R branchial cleft cyst on . FHT were confirmed prior to and after surgery. She received 1 day of Unasyn while admitted and was transitioned to a 7d course of Augmentin for discharge.  On day of discharge her pain was well controlled and she was otherwise meeting all milestones. She was discharged with plans to follow-up in 3-5 days with ENT, and with routine prenatal follow up.      Procedures:  branchial cleft cyst excision        Discharge Meds     Your medication list        ASK your doctor about these medications        Instructions Last Dose Given Next Dose Due   amoxicillin-pot clavulanate 875-125 mg tablet  Commonly known as: Augmentin  Ask about: Should I take this medication?      Take 1 tablet (875 mg) by mouth 2 times a day for 7 days.       PNV85-iron-folic acid-dha-fish 40-10-1-300 mg capsule                     Complications Requiring Follow-Up  Prenatal care    Test Results Pending At Discharge  Pending Labs       Order Current Status    Fungal Culture/Smear Collected (23 1430)    Tissue/Wound Culture/Smear Collected (23 1430)    POCT urinalysis dipstick In process            Outpatient Follow-Up  Future Appointments   Date Time Provider Department Center   2023 11:30 AM Shi Zuniga MD [id number] [place]   2023  3:30 PM [facility] [id number] OBGYNIMG ULTRASOUND 1 [id number] [facility]SE [facility]O [facility]   2023 11:30 AM Shi Zuniga MD [id number] [place]   2023  8:50 AM Shi Zuniga MD [id number] [place]   3/25/2024  8:45 AM Terrance Christy MD [id number]           Ivelisse Ledesma MD

## 2023-11-08 NOTE — PROGRESS NOTES
Patient was handed off to me by previous provider at 0200. For full history, physical, and prior ED course, please see previous provider note prior to patient handoff. This is an addendum to the record.     HOSPITAL COURSE/MEDICAL DECISION MAKING  In short, this is a 17-year-old female at 29 WGA presented to the emergency department due to neck pain.  Does have a history of a branchial cleft cyst for which she follows up with ENT.  This is a recurrent episode and she was actually due to have the cyst excised, however they found out she was pregnant at that time.  Was seen by her ENT earlier today after follow-up due to concerns for possible phlegmon development.  Had recently been on Augmentin for the past week, however not having any symptomatic relief.  Patient was evaluated by both ENT and OB/GYN here in the emergency department.  Per ENT, they do believe that she would benefit from drainage versus excision, however given her pregnancy status OB/GYN is also involved.  Patient signed out to me pending recommendations from both as well as as TVUS per OB/GYN's recommendations.  Suspect patient will likely require admission. Throughout the ED stay, the patient was monitored and re-examined for any changes in stability or symptomatology.     ED Course as of 11/08/23 0310   Wed Nov 08, 2023   0244 Per OB, they would like the patient transported to L&D triage following US given she is 29 wga. Following US, will likely be able to be cleared from ED standpoint [JV]   0300 US [facility] OB imaging order [JV]      ED Course User Index  [JV] Femi Finnegan MD         DIAGNOSIS  1.  Branchial cleft cyst    DISPOSITION  Transfer to L&D     I reviewed the patient´s case with Dr. Sosa who also saw the patient and agrees with the plan. The diagnosis and plan of care was also discussed with the patient. All the patient's questions were answered. The patient was receptive and agreeable to the plan of care. [The patient was  instructed to return to the emergency department if any symptoms recurred, worsened, or if there was any additional concerns.]    Femi Finnegan MD  Emergency Medicine PGY-3    This note was dictated using dragon dictation.  Please excuse any errors found in the note.

## 2023-11-08 NOTE — ED TRIAGE NOTES
Pt states she has a cyst in the right side of her jaw that has gotten bigger over the past week and is causing her pain. Pt states she has had surgery on it in the past and was due to have a second surgery but found out she was pregnant. Pt is currently 29 weeks OB and her OB advised her to come in. Pt states her OB told her that her wbc was 16. Pt has been on abx for 1 week. Pt has finished her abx. Pt is not having difficulty breathing or swallowing. But states her neck feels stiff and has a throbbing in her ear.

## 2023-11-08 NOTE — DISCHARGE INSTR - AVS FIRST PAGE
"Call your OB provider for contractions (tightening, \"balling up\") more than 4-6 times an hour; constant menstrual-like cramps in your lower belly; low, dull backache different from what you normally have; increased pressure or pain in your pelvis, lower belly, back or thighs; increased vaginal discharge or mucus-like watery or bloody discharge; red vaginal bleeding; leaking amniotic fluid (\"water breaks\"); chills or fever of 100.4 F or above; severe headache that does not go away; blurry vision; decreased baby movements; feeling that something is \"not right\"; feeling depressed or unable to cope   "

## 2023-11-08 NOTE — ANESTHESIA PROCEDURE NOTES
Airway  Date/Time: 11/8/2023 2:12 PM  Urgency: elective      Staffing  Performed: attending   Authorized by: Jie Chandra MD    Performed by: Jie Chandra MD  Patient location during procedure: OR    Indications and Patient Condition  Indications for airway management: anesthesia  Spontaneous ventilation: present  Sedation level: moderate (conscious sedation)  Preoxygenated: yes  Patient position: sniffing  MILS maintained throughout  Mask difficulty assessment: 1 - vent by mask  Planned trial extubation    Final Airway Details  Final airway type: endotracheal airway      Successful airway: ETT  Cuffed: yes   Successful intubation technique: video laryngoscopy  Blade size: #3  ETT size (mm): 6.0  Cormack-Lehane Classification: grade I - full view of glottis  Placement verified by: chest auscultation and capnometry   Cuff volume (mL): 22  Measured from: lips  Number of attempts at approach: 2  Ventilation between attempts: BVM  Number of other approaches attempted: 0    Additional Comments  DL x1 via RSI after preO2. Grade 3 view. SaO2 to 50's with prompt resolution after BMV. DL x1 with glidescope, grade 1 view. No debris in airway. Atraumatic.

## 2023-11-08 NOTE — ANESTHESIA PREPROCEDURE EVALUATION
Patient: Kary Crooks    Evaluation Method: In-person visit    Procedure Information       Date/Time: 11/08/23 1405    Procedure: Excision Cyst Branchial Cleft (Right) - Pt is 29 weeks pregnant    Location: [facility] OR  / [facility] OR    Surgeons: Terrance Christy MD            Relevant Problems   Anesthesia   (+) PONV (postoperative nausea and vomiting)      Eyes, Ears, Nose, and Throat  Facial abscess.      Other  29 weeks pregnant.       Clinical information reviewed:   Tobacco  Allergies  Meds   Med Hx  Surg Hx  OB Status  Fam Hx  Soc   Hx        NPO Detail:  NPO/Void Status  Carbonhydrate Drink Given Prior to Surgery? : N  Date of Last Liquid: 11/08/23  Time of Last Liquid: 0000  Date of Last Solid: 11/08/23  Time of Last Solid: 0000         OB/Gyn Evaluation    Present Pregnancy    Patient is pregnant now.   Obstetric History                Physical Exam    Airway  Mallampati: III     Cardiovascular    Dental    Pulmonary    Abdominal            Anesthesia Plan    ASA 2     general     intravenous induction   Anesthetic plan and risks discussed with patient.    Plan discussed with CRNA.

## 2023-11-08 NOTE — ANESTHESIA PROCEDURE NOTES
Airway  Date/Time: 11/8/2023 2:12 PM  Urgency: elective    Airway not difficult    Staffing  Performed: attending   Authorized by: Jie Chandra MD    Performed by: SANTANA Marin-CRNA  Patient location during procedure: OR    Indications and Patient Condition  Indications for airway management: anesthesia and airway protection  Spontaneous ventilation: present  Sedation level: deep  Preoxygenated: yes  Patient position: sniffing (LUIS ALFREDO)  Mask difficulty assessment: 1 - vent by mask    Final Airway Details  Final airway type: endotracheal airway      Successful airway: ETT  Cuffed: yes   Successful intubation technique: video laryngoscopy  Blade size: #4  ETT size (mm): 6.0  Cormack-Lehane Classification: grade I - full view of glottis  Placement verified by: chest auscultation and capnometry   Measured from: teeth  ETT to teeth (cm): 21  Number of attempts at approach: 2  Ventilation between attempts: 2 hand mask    Additional Comments  DL grade III view unable pass ETT, easy 2 hand mask ventilation between attempts, easy intubation with glidescope.  OB nurse with fetal monitoring present the entire case

## 2023-11-09 ASSESSMENT — PAIN SCALES - GENERAL: PAINLEVEL_OUTOF10: 1

## 2023-11-09 NOTE — SIGNIFICANT EVENT
Follow-up Phone Call Note:   Interview:  Care Type: Women's Health   Phone Number Call  [phone]   Call Outcome: Connected with patient   Patient Reports Feeling (symptoms) Are: better   Which Meds Were New Meds:  Yes   Which Meds to Continue:  Yes   Which Meds to Stop: no medications were discontinued   Who participated in medication reconciliation with the hospital staff?: you   In your professional opinion do you think there was a medication discrepancy or potential for medication discrepancy in this situation?: no   Medication Issues: no medication issues   Discharge Instructions Clear:   Yes   Patient Has a Primary Care Provider:     Yes   Post-hospitalization Follow-up Occurred According To Schedule:   no   Reason: appointment scheduled in future    Delivered Baby(ies): No         Mom's Discharge Date: 11/8/23      Patient Has Plan Specific Name And Number Of Who To Call For Concerns:  Yes   Stroke Patient:  No  Comments:    Date/Time Of Call: 11/9/23 at 1147   Call Back Done By: care coordinator   Callback Complete:  Yes                                                Mom's Discharge Date: 11/8/23      Patient Has Plan Specific Name And Number Of Who To Call For Concerns:  Yes   Stroke Patient:  No  Comments:    Date/Time Of Call: 11/9/23 at 1147   Call Back Done By: care coordinator   Callback Complete:  Yes

## 2023-11-09 NOTE — OP NOTE
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]     Procedures  Incision and drainage of an infected branchial cleft cyst    Surgeons      * Terrance Christy - Primary    Resident/Fellow/Other Assistant:  Surgeon(s) and Role: youner    Procedure Summary  Anesthesia: Consult  ASA: II  Anesthesia Staff: Anesthesiologist: Jie Chandra MD  CRNA: SANTANA Marin-CRNA  Estimated Blood Loss: 2mL  Intra-op Medications:   Medication Name Total Dose   lidocaine-epinephrine (Xylocaine W/EPI) 1 %-1:100,000 injection 7 mL   lactated Ringer's infusion Cannot be calculated              Anesthesia Record               Intraprocedure I/O Totals          Intake    Remifentanil Drip 0.00 mL    The total shown is the total volume documented since Anesthesia Start was filed.    Propofol Drip 0.00 mL    The total shown is the total volume documented since Anesthesia Start was filed.    Total Intake 0 mL          Specimen:   ID Type Source Tests Collected by Time   A : RIGHT NECK ABSCESS Swab ABSCESS FUNGAL CULTURE/SMEAR, TISSUE/WOUND CULTURE/SMEAR Terrance Christy MD 2023 1430        Staff:   Circulator: Melissa Pham RN  Relief Scrub: Alycia Valencia RN  Scrub Person: Emmie Solis         Drains and/or Catheters:   Open Drain Right Neck (Active)   Site Description Edema/swelling 23 1453   Dressing Status New drainage 23 1453   Drainage Appearance Serosanguineous 23 1546   Status Other (Comment) 23 1450         Findings: Inflamed right branchial cleft cyst with purulent fluid    Indications: Kary Crooks is an 17 y.o. 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  Patient has known history of a right  neck branchial cleft cyst.  This had previously gotten infected and required drainage.  When she returned for excision she found out she was pregnant and we had to delay the procedure.  She recently presented with clinical signs of infection including elevated white blood cell count, tenderness, increase in size of the cyst.  It did not respond to antibiotics.  She is here today for another incision and drainage    The patient was seen in the preoperative area. The risks, benefits, complications, treatment options, non-operative alternatives, expected recovery and outcomes were discussed with the patient. The possibilities of reaction to medication, pulmonary aspiration, injury to surrounding structures, bleeding, recurrent infection, the need for additional procedures, failure to diagnose a condition, and creating a complication requiring transfusion or operation were discussed with the patient. The patient concurred with the proposed plan, giving informed consent.  The site of surgery was properly noted/marked if necessary per policy. The patient has been actively warmed in preoperative area. Preoperative antibiotics have been ordered and given within 2 hours of incision. Venous thrombosis prophylaxis have been ordered including bilateral sequential compression devices    Procedure Details: Patient was taken to the operating room and laid supine on the table.  Timeout was performed, general anesthesia induced, patient was intubated.  The table was turned and the right neck was exposed.  The neck was then prepped and draped in sterile fashion.  The inferior aspect of the prior incision was opened with a 15 blade.  Blunt dissection was used to get down to the wall of the cyst.  The cyst was then entered and fluid was evacuated.  This was cultured to help guide antibiotic treatment.  The surgical bed was irrigated and hemostasis was achieved.  A Penrose drain was placed into the cavity and secured with suture.   Patient was then extubated without issue and taken to PACU in stable condition  Complications:  None; patient tolerated the procedure well.    Disposition: PACU - hemodynamically stable.  Condition: stable         Additional Details: Team member from labor and delivery was present for monitoring of the baby during the procedure        Terrance Christy  Phone Number: [phone]

## 2023-11-10 ENCOUNTER — APPOINTMENT (OUTPATIENT)
Dept: OTOLARYNGOLOGY | Facility: CLINIC | Age: 18
End: 2023-11-10
Payer: COMMERCIAL

## 2023-11-10 LAB
BACTERIA SPEC CULT: NORMAL
GRAM STN SPEC: NORMAL
GRAM STN SPEC: NORMAL

## 2023-11-13 ENCOUNTER — OFFICE VISIT (OUTPATIENT)
Dept: OTOLARYNGOLOGY | Facility: CLINIC | Age: 18
End: 2023-11-13
Payer: COMMERCIAL

## 2023-11-13 VITALS — TEMPERATURE: 97.3 F | BODY MASS INDEX: 42.15 KG/M2 | HEIGHT: 64 IN | WEIGHT: 246.91 LBS

## 2023-11-13 DIAGNOSIS — Q18.0 BRANCHIAL CLEFT CYST: Primary | ICD-10-CM

## 2023-11-13 PROCEDURE — 99024 POSTOP FOLLOW-UP VISIT: CPT | Performed by: OTOLARYNGOLOGY

## 2023-11-13 ASSESSMENT — PATIENT HEALTH QUESTIONNAIRE - PHQ9
1. LITTLE INTEREST OR PLEASURE IN DOING THINGS: NOT AT ALL
SUM OF ALL RESPONSES TO PHQ9 QUESTIONS 1 & 2: 0
2. FEELING DOWN, DEPRESSED OR HOPELESS: NOT AT ALL

## 2023-11-13 NOTE — PROGRESS NOTES
ENT Follow up Visit    History Of Present Illness  Kary Croosk is a 17 y.o. female presents for follow up of a branchial cleft cyst.  She was scheduled for reexcision however procedure was canceled the day of the procedure as she found out she was pregnant.  She is due this coming January.      11/13/23: Patient admitted after last visit and taken to the OR for I&D. Cultures negative. Penrose drain left in place. Here for drain removal. Symptoms much improved     Past Medical History  She has no past medical history on file.  Patient Active Problem List   Diagnosis    Cyst in neck at birth    Fibrosis of skin    Seasonal allergic rhinitis due to pollen    Sprain of calcaneofibular ligament    Vitamin D deficiency    Congenital branchial cleft cyst    Branchial cleft cyst    PONV (postoperative nausea and vomiting)       Surgical History  I&d neck abscess     Social History  She reports that she has never smoked. She does not have any smokeless tobacco history on file. She reports that she does not currently use alcohol. She reports current drug use. Drug: Marijuana.    Family History  No family history on file.     Allergies  Patient has no known allergies.     Physical Exam:  CONSTITUTIONAL:  No acute distress  VOICE:  No hoarseness or other abnormality  RESPIRATION:  Breathing comfortably, no stridor  CV:  No clubbing/cyanosis/edema in hands  EYES:  EOM intact, sclera normal  NEURO:  Alert and oriented times 3, Cranial nerves II-XII grossly intact and symmetric bilaterally  HEAD AND FACE:  Symmetric facial features, no masses or lesions, sinuses non-tender to palpation  SALIVARY GLANDS:  Parotid and submandibular glands normal bilaterally  EARS:  Normal external ears, external auditory canals, and TMs to otoscopy, normal hearing to whispered voice.  NOSE:  External nose midline, anterior rhinoscopy is normal with limited visualization to the anterior aspect of the interior turbinates, no bleeding or drainage,  no lesions  ORAL CAVITY/OROPHARYNX/LIPS:  Normal mucous membranes, normal floor of mouth/tongue/OP, no masses or lesions  PHARYNGEAL WALLS:  No masses or lesions  NECK/LYMPH: penrose drain removed, no purulence  PSYCH:  Alert and oriented with appropriate mood and affect  Assessment and Plan  17 y.o. 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  Acute infection was noted at the time of her first procedure therefore incision and drainage was completed.  At the second operation she found out she was pregnant and procedure was canceled. Developed new infection while pregnant that required I&D    -complete antibiotics  -discussed wound care  -follow up after delivery to discuss excision, earlier with any issues    Terrance Christy MD

## 2023-11-14 PROBLEM — J30.1 SEASONAL ALLERGIC RHINITIS DUE TO POLLEN: Status: RESOLVED | Noted: 2017-08-25 | Resolved: 2023-11-14

## 2023-11-14 PROBLEM — Z3A.30 30 WEEKS GESTATION OF PREGNANCY (HHS-HCC): Status: ACTIVE | Noted: 2023-11-14

## 2023-11-14 PROBLEM — E55.9 VITAMIN D DEFICIENCY: Status: RESOLVED | Noted: 2019-08-15 | Resolved: 2023-11-14

## 2023-11-14 PROBLEM — S93.419A SPRAIN OF CALCANEOFIBULAR LIGAMENT: Status: RESOLVED | Noted: 2019-03-26 | Resolved: 2023-11-14

## 2023-11-20 ENCOUNTER — ROUTINE PRENATAL (OUTPATIENT)
Dept: OBSTETRICS AND GYNECOLOGY | Facility: CLINIC | Age: 18
End: 2023-11-20
Payer: COMMERCIAL

## 2023-11-20 VITALS — DIASTOLIC BLOOD PRESSURE: 72 MMHG | WEIGHT: 248.5 LBS | SYSTOLIC BLOOD PRESSURE: 122 MMHG | BODY MASS INDEX: 42.65 KG/M2

## 2023-11-20 DIAGNOSIS — Q18.0 CONGENITAL BRANCHIAL CLEFT CYST: ICD-10-CM

## 2023-11-20 DIAGNOSIS — Z3A.31 31 WEEKS GESTATION OF PREGNANCY (HHS-HCC): Primary | ICD-10-CM

## 2023-11-20 PROCEDURE — 0501F PRENATAL FLOW SHEET: CPT | Performed by: OBSTETRICS & GYNECOLOGY

## 2023-11-20 NOTE — PROGRESS NOTES
Had surgery on neck cyst 11/8/23.   Doing well. No complaints. Will follow up w Dr Christy ENT after delivery.    Moods are good: stopped meds.    Has BMI 42 : growth scan scheduled. Baby moves well daily. Caloric intake discussed. Has gained 40 lbs so far.    Shi Zuniga MD

## 2023-11-27 LAB
FUNGUS SPEC CULT: NORMAL
FUNGUS SPEC FUNGUS STN: NORMAL

## 2023-11-29 ENCOUNTER — ANCILLARY PROCEDURE (OUTPATIENT)
Dept: RADIOLOGY | Facility: CLINIC | Age: 18
End: 2023-11-29
Payer: COMMERCIAL

## 2023-11-29 DIAGNOSIS — O99.212 OBESITY AFFECTING PREGNANCY IN SECOND TRIMESTER, UNSPECIFIED OBESITY TYPE (HHS-HCC): ICD-10-CM

## 2023-11-29 PROCEDURE — 76819 FETAL BIOPHYS PROFIL W/O NST: CPT | Performed by: OBSTETRICS & GYNECOLOGY

## 2023-11-29 PROCEDURE — 76816 OB US FOLLOW-UP PER FETUS: CPT

## 2023-11-29 PROCEDURE — 76816 OB US FOLLOW-UP PER FETUS: CPT | Performed by: OBSTETRICS & GYNECOLOGY

## 2023-11-29 PROCEDURE — 76819 FETAL BIOPHYS PROFIL W/O NST: CPT

## 2023-12-06 ENCOUNTER — APPOINTMENT (OUTPATIENT)
Dept: OBSTETRICS AND GYNECOLOGY | Facility: CLINIC | Age: 18
End: 2023-12-06
Payer: COMMERCIAL

## 2023-12-07 ENCOUNTER — ROUTINE PRENATAL (OUTPATIENT)
Dept: OBSTETRICS AND GYNECOLOGY | Facility: CLINIC | Age: 18
End: 2023-12-07
Payer: COMMERCIAL

## 2023-12-07 VITALS — WEIGHT: 258.5 LBS | BODY MASS INDEX: 44.37 KG/M2 | SYSTOLIC BLOOD PRESSURE: 118 MMHG | DIASTOLIC BLOOD PRESSURE: 68 MMHG

## 2023-12-07 DIAGNOSIS — Z3A.33 33 WEEKS GESTATION OF PREGNANCY (HHS-HCC): Primary | ICD-10-CM

## 2023-12-07 PROBLEM — Q18.0 BRANCHIAL CLEFT CYST: Status: RESOLVED | Noted: 2023-11-08 | Resolved: 2023-12-07

## 2023-12-07 PROBLEM — Z3A.30 30 WEEKS GESTATION OF PREGNANCY (HHS-HCC): Status: RESOLVED | Noted: 2023-11-14 | Resolved: 2023-12-07

## 2023-12-07 PROBLEM — R11.2 PONV (POSTOPERATIVE NAUSEA AND VOMITING): Status: RESOLVED | Noted: 2023-11-08 | Resolved: 2023-12-07

## 2023-12-07 PROBLEM — Z98.890 PONV (POSTOPERATIVE NAUSEA AND VOMITING): Status: RESOLVED | Noted: 2023-11-08 | Resolved: 2023-12-07

## 2023-12-07 PROCEDURE — 0501F PRENATAL FLOW SHEET: CPT | Performed by: OBSTETRICS & GYNECOLOGY

## 2023-12-07 NOTE — PROGRESS NOTES
No complaints. Feeling good. Pain is gone from her neck.  US wnl, 4lb 11 oz  Follow up US at 36 weeks then do weekly NST for BMI     Pt says wt was   250 lb pre preg , but lost wt to 170 lb months just before preg , now back to 250 lb = BMI 44.  Healthy diet discussed again.     Moods are good/ not on meds.

## 2023-12-18 ENCOUNTER — APPOINTMENT (OUTPATIENT)
Dept: OBSTETRICS AND GYNECOLOGY | Facility: CLINIC | Age: 18
End: 2023-12-18
Payer: COMMERCIAL

## 2023-12-20 ENCOUNTER — ROUTINE PRENATAL (OUTPATIENT)
Dept: OBSTETRICS AND GYNECOLOGY | Facility: CLINIC | Age: 18
End: 2023-12-20
Payer: COMMERCIAL

## 2023-12-20 VITALS
SYSTOLIC BLOOD PRESSURE: 110 MMHG | WEIGHT: 261.38 LBS | DIASTOLIC BLOOD PRESSURE: 70 MMHG | BODY MASS INDEX: 44.86 KG/M2

## 2023-12-20 DIAGNOSIS — Z3A.35 35 WEEKS GESTATION OF PREGNANCY (HHS-HCC): Primary | ICD-10-CM

## 2023-12-20 PROCEDURE — 59426 ANTEPARTUM CARE ONLY: CPT | Performed by: OBSTETRICS & GYNECOLOGY

## 2023-12-20 NOTE — PROGRESS NOTES
Doing well. No complaints.     Getting ready for baby , ordering breast pump.  Needs to find pediatrician.   NST next week for BMI 44 w GBS swab    Shi Zuniga MD

## 2023-12-26 ENCOUNTER — ANCILLARY PROCEDURE (OUTPATIENT)
Dept: RADIOLOGY | Facility: CLINIC | Age: 18
End: 2023-12-26
Payer: COMMERCIAL

## 2023-12-26 DIAGNOSIS — O99.212 OBESITY AFFECTING PREGNANCY IN SECOND TRIMESTER, UNSPECIFIED OBESITY TYPE (HHS-HCC): ICD-10-CM

## 2023-12-26 PROCEDURE — 76816 OB US FOLLOW-UP PER FETUS: CPT | Performed by: OBSTETRICS & GYNECOLOGY

## 2023-12-26 PROCEDURE — 76816 OB US FOLLOW-UP PER FETUS: CPT

## 2023-12-26 PROCEDURE — 76819 FETAL BIOPHYS PROFIL W/O NST: CPT | Performed by: OBSTETRICS & GYNECOLOGY

## 2023-12-26 PROCEDURE — 76819 FETAL BIOPHYS PROFIL W/O NST: CPT

## 2024-01-03 ENCOUNTER — PROCEDURE VISIT (OUTPATIENT)
Dept: OBSTETRICS AND GYNECOLOGY | Facility: CLINIC | Age: 19
End: 2024-01-03
Payer: COMMERCIAL

## 2024-01-03 VITALS — SYSTOLIC BLOOD PRESSURE: 118 MMHG | BODY MASS INDEX: 45.92 KG/M2 | DIASTOLIC BLOOD PRESSURE: 66 MMHG | WEIGHT: 267.5 LBS

## 2024-01-03 DIAGNOSIS — Z3A.37 37 WEEKS GESTATION OF PREGNANCY (HHS-HCC): ICD-10-CM

## 2024-01-03 PROCEDURE — 87081 CULTURE SCREEN ONLY: CPT

## 2024-01-03 PROCEDURE — 59025 FETAL NON-STRESS TEST: CPT | Performed by: OBSTETRICS & GYNECOLOGY

## 2024-01-03 PROCEDURE — 99213 OFFICE O/P EST LOW 20 MIN: CPT | Performed by: OBSTETRICS & GYNECOLOGY

## 2024-01-03 NOTE — PROGRESS NOTES
Patient ID: Kary Crooks is a 18 y.o. female.    Procedures  NST for BMI   NST     FHR reactive, baseline 150s , no decels, at least 2 accelerations noted.  Audible fetal mvmts.  No ctxs.      GBS done today.  Ready for hosp , watching videos for labor.  Peds is Dr. Columba Spaulding   Has breast pump.

## 2024-01-06 LAB — GP B STREP GENITAL QL CULT: NORMAL

## 2024-01-10 ENCOUNTER — PREP FOR PROCEDURE (OUTPATIENT)
Dept: OBSTETRICS AND GYNECOLOGY | Facility: CLINIC | Age: 19
End: 2024-01-10

## 2024-01-10 ENCOUNTER — PROCEDURE VISIT (OUTPATIENT)
Dept: OBSTETRICS AND GYNECOLOGY | Facility: CLINIC | Age: 19
End: 2024-01-10
Payer: COMMERCIAL

## 2024-01-10 VITALS
BODY MASS INDEX: 46.37 KG/M2 | DIASTOLIC BLOOD PRESSURE: 68 MMHG | WEIGHT: 270.13 LBS | SYSTOLIC BLOOD PRESSURE: 110 MMHG

## 2024-01-10 DIAGNOSIS — Z3A.40 40 WEEKS GESTATION OF PREGNANCY (HHS-HCC): Primary | ICD-10-CM

## 2024-01-10 DIAGNOSIS — Z3A.38 38 WEEKS GESTATION OF PREGNANCY (HHS-HCC): ICD-10-CM

## 2024-01-10 PROCEDURE — 59025 FETAL NON-STRESS TEST: CPT | Performed by: OBSTETRICS & GYNECOLOGY

## 2024-01-10 PROCEDURE — 99213 OFFICE O/P EST LOW 20 MIN: CPT | Performed by: OBSTETRICS & GYNECOLOGY

## 2024-01-10 NOTE — PROGRESS NOTES
Patient ID: Kary Crooks is a 18 y.o. female.    Procedures  NST     FHR reactive, baseline 150s , no decels, at least 2 accelerations noted.  Audible fetal mvmts.  No ctxs.     Wt gain and better diet discussed.   Cervix : 1 cm.   Wants labor induction on Jan 22  at 8 pm / scheduled.

## 2024-01-18 ENCOUNTER — PROCEDURE VISIT (OUTPATIENT)
Dept: OBSTETRICS AND GYNECOLOGY | Facility: CLINIC | Age: 19
End: 2024-01-18
Payer: COMMERCIAL

## 2024-01-18 VITALS
BODY MASS INDEX: 46.88 KG/M2 | WEIGHT: 273.13 LBS | SYSTOLIC BLOOD PRESSURE: 122 MMHG | DIASTOLIC BLOOD PRESSURE: 70 MMHG

## 2024-01-18 DIAGNOSIS — Z3A.39 39 WEEKS GESTATION OF PREGNANCY (HHS-HCC): ICD-10-CM

## 2024-01-18 PROBLEM — Q18.9 CYST IN NECK AT BIRTH: Status: RESOLVED | Noted: 2023-10-30 | Resolved: 2024-01-18

## 2024-01-18 PROCEDURE — 0501F PRENATAL FLOW SHEET: CPT | Performed by: OBSTETRICS & GYNECOLOGY

## 2024-01-18 PROCEDURE — 59025 FETAL NON-STRESS TEST: CPT | Performed by: OBSTETRICS & GYNECOLOGY

## 2024-01-18 NOTE — PROGRESS NOTES
NST for BMI 39w5d    FHR reactive, baseline 150s , no decels, at least 2 accelerations noted.  Audible fetal mvmts.  No ctxs.     Wanted membrane stripped VE 2/50/-2   Good mvmts at home. Ready for baby and hosp stay . She Chose induction date 1/22/24 40w2d    Shi Zuniga MD

## 2024-01-22 ENCOUNTER — HOSPITAL ENCOUNTER (INPATIENT)
Facility: HOSPITAL | Age: 19
LOS: 3 days | Discharge: HOME | End: 2024-01-25
Attending: OBSTETRICS & GYNECOLOGY | Admitting: OBSTETRICS & GYNECOLOGY
Payer: COMMERCIAL

## 2024-01-22 ENCOUNTER — APPOINTMENT (OUTPATIENT)
Dept: OBSTETRICS AND GYNECOLOGY | Facility: HOSPITAL | Age: 19
End: 2024-01-22
Payer: COMMERCIAL

## 2024-01-22 DIAGNOSIS — O26.893 RH NEGATIVE STATUS DURING PREGNANCY IN THIRD TRIMESTER (HHS-HCC): ICD-10-CM

## 2024-01-22 DIAGNOSIS — Z3A.40 40 WEEKS GESTATION OF PREGNANCY (HHS-HCC): ICD-10-CM

## 2024-01-22 DIAGNOSIS — Z67.91 RH NEGATIVE STATUS DURING PREGNANCY IN THIRD TRIMESTER (HHS-HCC): ICD-10-CM

## 2024-01-22 DIAGNOSIS — R52 PAIN: ICD-10-CM

## 2024-01-22 PROBLEM — Z34.90 TERM PREGNANCY (HHS-HCC): Status: ACTIVE | Noted: 2024-01-22

## 2024-01-22 LAB
ABO GROUP (TYPE) IN BLOOD: NORMAL
ANTIBODY SCREEN: NORMAL
ERYTHROCYTE [DISTWIDTH] IN BLOOD BY AUTOMATED COUNT: 13.7 % (ref 11.5–14.5)
HCT VFR BLD AUTO: 34.9 % (ref 36–46)
HGB BLD-MCNC: 11.6 G/DL (ref 12–16)
MCH RBC QN AUTO: 27.2 PG (ref 26–34)
MCHC RBC AUTO-ENTMCNC: 33.2 G/DL (ref 32–36)
MCV RBC AUTO: 82 FL (ref 80–100)
NRBC BLD-RTO: 0 /100 WBCS (ref 0–0)
PLATELET # BLD AUTO: 284 X10*3/UL (ref 150–450)
RBC # BLD AUTO: 4.27 X10*6/UL (ref 4–5.2)
RH FACTOR (ANTIGEN D): NORMAL
WBC # BLD AUTO: 14 X10*3/UL (ref 4.4–11.3)

## 2024-01-22 PROCEDURE — 85027 COMPLETE CBC AUTOMATED: CPT | Performed by: OBSTETRICS & GYNECOLOGY

## 2024-01-22 PROCEDURE — 86920 COMPATIBILITY TEST SPIN: CPT

## 2024-01-22 PROCEDURE — 2500000005 HC RX 250 GENERAL PHARMACY W/O HCPCS

## 2024-01-22 PROCEDURE — 36415 COLL VENOUS BLD VENIPUNCTURE: CPT | Performed by: OBSTETRICS & GYNECOLOGY

## 2024-01-22 PROCEDURE — 2500000001 HC RX 250 WO HCPCS SELF ADMINISTERED DRUGS (ALT 637 FOR MEDICARE OP): Performed by: OBSTETRICS & GYNECOLOGY

## 2024-01-22 PROCEDURE — 2500000004 HC RX 250 GENERAL PHARMACY W/ HCPCS (ALT 636 FOR OP/ED)

## 2024-01-22 PROCEDURE — 10H07YZ INSERTION OF OTHER DEVICE INTO PRODUCTS OF CONCEPTION, VIA NATURAL OR ARTIFICIAL OPENING: ICD-10-PCS | Performed by: ADVANCED PRACTICE MIDWIFE

## 2024-01-22 PROCEDURE — 1120000001 HC OB PRIVATE ROOM DAILY

## 2024-01-22 PROCEDURE — 86780 TREPONEMA PALLIDUM: CPT | Mod: STJLAB | Performed by: OBSTETRICS & GYNECOLOGY

## 2024-01-22 PROCEDURE — 7210000002 HC LABOR PER HOUR

## 2024-01-22 PROCEDURE — 86901 BLOOD TYPING SEROLOGIC RH(D): CPT | Performed by: OBSTETRICS & GYNECOLOGY

## 2024-01-22 RX ORDER — NIFEDIPINE 10 MG/1
10 CAPSULE ORAL ONCE AS NEEDED
Status: DISCONTINUED | OUTPATIENT
Start: 2024-01-22 | End: 2024-01-24

## 2024-01-22 RX ORDER — OXYTOCIN/0.9 % SODIUM CHLORIDE 30/500 ML
60 PLASTIC BAG, INJECTION (ML) INTRAVENOUS ONCE AS NEEDED
Status: DISCONTINUED | OUTPATIENT
Start: 2024-01-22 | End: 2024-01-24

## 2024-01-22 RX ORDER — SODIUM CHLORIDE, SODIUM LACTATE, POTASSIUM CHLORIDE, CALCIUM CHLORIDE 600; 310; 30; 20 MG/100ML; MG/100ML; MG/100ML; MG/100ML
125 INJECTION, SOLUTION INTRAVENOUS CONTINUOUS
Status: DISCONTINUED | OUTPATIENT
Start: 2024-01-22 | End: 2024-01-24

## 2024-01-22 RX ORDER — LABETALOL HYDROCHLORIDE 5 MG/ML
20 INJECTION, SOLUTION INTRAVENOUS ONCE AS NEEDED
Status: DISCONTINUED | OUTPATIENT
Start: 2024-01-22 | End: 2024-01-24

## 2024-01-22 RX ORDER — MISOPROSTOL 200 UG/1
800 TABLET ORAL ONCE AS NEEDED
Status: COMPLETED | OUTPATIENT
Start: 2024-01-22 | End: 2024-01-23

## 2024-01-22 RX ORDER — ONDANSETRON 4 MG/1
4 TABLET, FILM COATED ORAL EVERY 6 HOURS PRN
Status: DISCONTINUED | OUTPATIENT
Start: 2024-01-22 | End: 2024-01-24

## 2024-01-22 RX ORDER — TRANEXAMIC ACID 100 MG/ML
1000 INJECTION, SOLUTION INTRAVENOUS ONCE AS NEEDED
Status: COMPLETED | OUTPATIENT
Start: 2024-01-22 | End: 2024-01-23

## 2024-01-22 RX ORDER — METHYLERGONOVINE MALEATE 0.2 MG/ML
0.2 INJECTION INTRAVENOUS ONCE AS NEEDED
Status: DISCONTINUED | OUTPATIENT
Start: 2024-01-22 | End: 2024-01-24

## 2024-01-22 RX ORDER — LIDOCAINE HYDROCHLORIDE 10 MG/ML
30 INJECTION INFILTRATION; PERINEURAL ONCE AS NEEDED
Status: DISCONTINUED | OUTPATIENT
Start: 2024-01-22 | End: 2024-01-24

## 2024-01-22 RX ORDER — CARBOPROST TROMETHAMINE 250 UG/ML
250 INJECTION, SOLUTION INTRAMUSCULAR ONCE AS NEEDED
Status: COMPLETED | OUTPATIENT
Start: 2024-01-22 | End: 2024-01-23

## 2024-01-22 RX ORDER — METOCLOPRAMIDE HYDROCHLORIDE 5 MG/ML
10 INJECTION INTRAMUSCULAR; INTRAVENOUS EVERY 6 HOURS PRN
Status: DISCONTINUED | OUTPATIENT
Start: 2024-01-22 | End: 2024-01-24

## 2024-01-22 RX ORDER — OXYTOCIN 10 [USP'U]/ML
10 INJECTION, SOLUTION INTRAMUSCULAR; INTRAVENOUS ONCE AS NEEDED
Status: DISCONTINUED | OUTPATIENT
Start: 2024-01-22 | End: 2024-01-24

## 2024-01-22 RX ORDER — ONDANSETRON HYDROCHLORIDE 2 MG/ML
4 INJECTION, SOLUTION INTRAVENOUS EVERY 6 HOURS PRN
Status: DISCONTINUED | OUTPATIENT
Start: 2024-01-22 | End: 2024-01-24

## 2024-01-22 RX ORDER — LOPERAMIDE HYDROCHLORIDE 2 MG/1
4 CAPSULE ORAL EVERY 2 HOUR PRN
Status: DISCONTINUED | OUTPATIENT
Start: 2024-01-22 | End: 2024-01-24

## 2024-01-22 RX ORDER — METOCLOPRAMIDE 10 MG/1
10 TABLET ORAL EVERY 6 HOURS PRN
Status: DISCONTINUED | OUTPATIENT
Start: 2024-01-22 | End: 2024-01-24

## 2024-01-22 RX ORDER — TERBUTALINE SULFATE 1 MG/ML
0.25 INJECTION SUBCUTANEOUS ONCE AS NEEDED
Status: DISCONTINUED | OUTPATIENT
Start: 2024-01-22 | End: 2024-01-24

## 2024-01-22 RX ORDER — HYDRALAZINE HYDROCHLORIDE 20 MG/ML
5 INJECTION INTRAMUSCULAR; INTRAVENOUS ONCE AS NEEDED
Status: DISCONTINUED | OUTPATIENT
Start: 2024-01-22 | End: 2024-01-24

## 2024-01-22 RX ADMIN — MISOPROSTOL 25 MCG: 100 TABLET ORAL at 21:53

## 2024-01-22 ASSESSMENT — PAIN SCALES - GENERAL
PAINLEVEL_OUTOF10: 3
PAINLEVEL_OUTOF10: 3
PAINLEVEL_OUTOF10: 0 - NO PAIN
PAINLEVEL_OUTOF10: 0 - NO PAIN

## 2024-01-23 ENCOUNTER — ANESTHESIA (OUTPATIENT)
Dept: OBSTETRICS AND GYNECOLOGY | Facility: HOSPITAL | Age: 19
End: 2024-01-23
Payer: COMMERCIAL

## 2024-01-23 ENCOUNTER — ANESTHESIA EVENT (OUTPATIENT)
Dept: OBSTETRICS AND GYNECOLOGY | Facility: HOSPITAL | Age: 19
End: 2024-01-23
Payer: COMMERCIAL

## 2024-01-23 PROBLEM — O14.03 MILD PRE-ECLAMPSIA IN THIRD TRIMESTER (HHS-HCC): Status: ACTIVE | Noted: 2024-01-23

## 2024-01-23 PROBLEM — O61.9 FAILED INDUCTION OF LABOR (HHS-HCC): Status: ACTIVE | Noted: 2024-01-23

## 2024-01-23 LAB
ABO GROUP (TYPE) IN BLOOD: NORMAL
ALBUMIN SERPL BCP-MCNC: 3.1 G/DL (ref 3.4–5)
ALP SERPL-CCNC: 127 U/L (ref 33–110)
ALT SERPL W P-5'-P-CCNC: 9 U/L (ref 7–45)
ANION GAP SERPL CALC-SCNC: 14 MMOL/L (ref 10–20)
AST SERPL W P-5'-P-CCNC: 12 U/L (ref 9–39)
BILIRUB SERPL-MCNC: 0.5 MG/DL (ref 0–1.2)
BUN SERPL-MCNC: 11 MG/DL (ref 6–23)
CALCIUM SERPL-MCNC: 8.7 MG/DL (ref 8.6–10.3)
CHLORIDE SERPL-SCNC: 106 MMOL/L (ref 98–107)
CO2 SERPL-SCNC: 19 MMOL/L (ref 21–32)
CREAT SERPL-MCNC: 0.45 MG/DL (ref 0.5–1.05)
CREAT UR-MCNC: 91.9 MG/DL (ref 20–320)
EGFRCR SERPLBLD CKD-EPI 2021: >90 ML/MIN/1.73M*2
GLUCOSE SERPL-MCNC: 97 MG/DL (ref 74–99)
POTASSIUM SERPL-SCNC: 4 MMOL/L (ref 3.5–5.3)
PROT SERPL-MCNC: 6.2 G/DL (ref 6.4–8.2)
PROT UR-ACNC: 64 MG/DL (ref 5–24)
PROT/CREAT UR: 0.7 MG/MG CREAT (ref 0–0.17)
RH FACTOR (ANTIGEN D): NORMAL
SODIUM SERPL-SCNC: 135 MMOL/L (ref 136–145)
TREPONEMA PALLIDUM IGG+IGM AB [PRESENCE] IN SERUM OR PLASMA BY IMMUNOASSAY: NONREACTIVE

## 2024-01-23 PROCEDURE — 3E0P7VZ INTRODUCTION OF HORMONE INTO FEMALE REPRODUCTIVE, VIA NATURAL OR ARTIFICIAL OPENING: ICD-10-PCS | Performed by: OBSTETRICS & GYNECOLOGY

## 2024-01-23 PROCEDURE — 01968 ANES/ANALG CS DLVR NEURAXIAL: CPT | Performed by: NURSE ANESTHETIST, CERTIFIED REGISTERED

## 2024-01-23 PROCEDURE — 01967 NEURAXL LBR ANES VAG DLVR: CPT | Performed by: NURSE ANESTHETIST, CERTIFIED REGISTERED

## 2024-01-23 PROCEDURE — 2500000005 HC RX 250 GENERAL PHARMACY W/O HCPCS: Performed by: NURSE ANESTHETIST, CERTIFIED REGISTERED

## 2024-01-23 PROCEDURE — 80053 COMPREHEN METABOLIC PANEL: CPT | Performed by: ADVANCED PRACTICE MIDWIFE

## 2024-01-23 PROCEDURE — 2500000004 HC RX 250 GENERAL PHARMACY W/ HCPCS (ALT 636 FOR OP/ED): Performed by: ADVANCED PRACTICE MIDWIFE

## 2024-01-23 PROCEDURE — 2500000005 HC RX 250 GENERAL PHARMACY W/O HCPCS: Performed by: OBSTETRICS & GYNECOLOGY

## 2024-01-23 PROCEDURE — 51702 INSERT TEMP BLADDER CATH: CPT

## 2024-01-23 PROCEDURE — 2720000007 HC OR 272 NO HCPCS: Performed by: OBSTETRICS & GYNECOLOGY

## 2024-01-23 PROCEDURE — 7210000002 HC LABOR PER HOUR

## 2024-01-23 PROCEDURE — 59050 FETAL MONITOR W/REPORT: CPT

## 2024-01-23 PROCEDURE — 1120000001 HC OB PRIVATE ROOM DAILY

## 2024-01-23 PROCEDURE — 3700000018 HC OB ANESTHESIA C-SECTION: Performed by: OBSTETRICS & GYNECOLOGY

## 2024-01-23 PROCEDURE — 2500000004 HC RX 250 GENERAL PHARMACY W/ HCPCS (ALT 636 FOR OP/ED): Performed by: OBSTETRICS & GYNECOLOGY

## 2024-01-23 PROCEDURE — 3E033VJ INTRODUCTION OF OTHER HORMONE INTO PERIPHERAL VEIN, PERCUTANEOUS APPROACH: ICD-10-PCS | Performed by: OBSTETRICS & GYNECOLOGY

## 2024-01-23 PROCEDURE — 59514 CESAREAN DELIVERY ONLY: CPT | Performed by: OBSTETRICS & GYNECOLOGY

## 2024-01-23 PROCEDURE — 7100000016 HC LABOR RECOVERY PER HOUR: Performed by: OBSTETRICS & GYNECOLOGY

## 2024-01-23 PROCEDURE — 3E0H7GC INTRODUCTION OF OTHER THERAPEUTIC SUBSTANCE INTO LOWER GI, VIA NATURAL OR ARTIFICIAL OPENING: ICD-10-PCS | Performed by: OBSTETRICS & GYNECOLOGY

## 2024-01-23 PROCEDURE — 10907ZC DRAINAGE OF AMNIOTIC FLUID, THERAPEUTIC FROM PRODUCTS OF CONCEPTION, VIA NATURAL OR ARTIFICIAL OPENING: ICD-10-PCS | Performed by: OBSTETRICS & GYNECOLOGY

## 2024-01-23 PROCEDURE — 2500000004 HC RX 250 GENERAL PHARMACY W/ HCPCS (ALT 636 FOR OP/ED): Performed by: NURSE ANESTHETIST, CERTIFIED REGISTERED

## 2024-01-23 PROCEDURE — 3700000014 HC AN EPIDURAL BLOCK CHARGE: Performed by: OBSTETRICS & GYNECOLOGY

## 2024-01-23 PROCEDURE — 10H073Z INSERTION OF MONITORING ELECTRODE INTO PRODUCTS OF CONCEPTION, VIA NATURAL OR ARTIFICIAL OPENING: ICD-10-PCS | Performed by: OBSTETRICS & GYNECOLOGY

## 2024-01-23 PROCEDURE — 2500000001 HC RX 250 WO HCPCS SELF ADMINISTERED DRUGS (ALT 637 FOR MEDICARE OP): Performed by: NURSE ANESTHETIST, CERTIFIED REGISTERED

## 2024-01-23 PROCEDURE — 82570 ASSAY OF URINE CREATININE: CPT | Performed by: STUDENT IN AN ORGANIZED HEALTH CARE EDUCATION/TRAINING PROGRAM

## 2024-01-23 PROCEDURE — 88307 TISSUE EXAM BY PATHOLOGIST: CPT | Performed by: PATHOLOGY

## 2024-01-23 PROCEDURE — 88307 TISSUE EXAM BY PATHOLOGIST: CPT | Mod: TC,SUR,STJLAB | Performed by: OBSTETRICS & GYNECOLOGY

## 2024-01-23 PROCEDURE — 59515 CESAREAN DELIVERY: CPT | Performed by: OBSTETRICS & GYNECOLOGY

## 2024-01-23 PROCEDURE — 4A1H7CZ MONITORING OF PRODUCTS OF CONCEPTION, CARDIAC RATE, VIA NATURAL OR ARTIFICIAL OPENING: ICD-10-PCS | Performed by: OBSTETRICS & GYNECOLOGY

## 2024-01-23 RX ORDER — DEXMEDETOMIDINE HYDROCHLORIDE 100 UG/ML
INJECTION, SOLUTION INTRAVENOUS AS NEEDED
Status: DISCONTINUED | OUTPATIENT
Start: 2024-01-23 | End: 2024-01-23

## 2024-01-23 RX ORDER — FENTANYL/ROPIVACAINE/NS/PF 2MCG/ML-.2
0-25 PLASTIC BAG, INJECTION (ML) INJECTION CONTINUOUS
Status: DISCONTINUED | OUTPATIENT
Start: 2024-01-23 | End: 2024-01-23

## 2024-01-23 RX ORDER — OXYTOCIN/0.9 % SODIUM CHLORIDE 30/500 ML
2-30 PLASTIC BAG, INJECTION (ML) INTRAVENOUS CONTINUOUS
Status: DISCONTINUED | OUTPATIENT
Start: 2024-01-23 | End: 2024-01-23

## 2024-01-23 RX ORDER — MORPHINE SULFATE 1 MG/ML
INJECTION, SOLUTION EPIDURAL; INTRATHECAL; INTRAVENOUS AS NEEDED
Status: DISCONTINUED | OUTPATIENT
Start: 2024-01-23 | End: 2024-01-23

## 2024-01-23 RX ORDER — LIDOCAINE HYDROCHLORIDE 20 MG/ML
INJECTION, SOLUTION EPIDURAL; INFILTRATION; INTRACAUDAL; PERINEURAL AS NEEDED
Status: DISCONTINUED | OUTPATIENT
Start: 2024-01-23 | End: 2024-01-23

## 2024-01-23 RX ORDER — PROPOFOL 10 MG/ML
INJECTION, EMULSION INTRAVENOUS AS NEEDED
Status: DISCONTINUED | OUTPATIENT
Start: 2024-01-23 | End: 2024-01-23

## 2024-01-23 RX ORDER — PHENYLEPHRINE HCL IN 0.9% NACL 1 MG/10 ML
SYRINGE (ML) INTRAVENOUS AS NEEDED
Status: DISCONTINUED | OUTPATIENT
Start: 2024-01-23 | End: 2024-01-23

## 2024-01-23 RX ORDER — ACETAMINOPHEN 120 MG/1
SUPPOSITORY RECTAL AS NEEDED
Status: DISCONTINUED | OUTPATIENT
Start: 2024-01-23 | End: 2024-01-23

## 2024-01-23 RX ORDER — FENTANYL CITRATE 50 UG/ML
INJECTION, SOLUTION INTRAMUSCULAR; INTRAVENOUS AS NEEDED
Status: DISCONTINUED | OUTPATIENT
Start: 2024-01-23 | End: 2024-01-23

## 2024-01-23 RX ORDER — CEFAZOLIN SODIUM 1 G/50ML
SOLUTION INTRAVENOUS AS NEEDED
Status: DISCONTINUED | OUTPATIENT
Start: 2024-01-23 | End: 2024-01-23

## 2024-01-23 RX ORDER — SCOLOPAMINE TRANSDERMAL SYSTEM 1 MG/1
PATCH, EXTENDED RELEASE TRANSDERMAL AS NEEDED
Status: DISCONTINUED | OUTPATIENT
Start: 2024-01-23 | End: 2024-01-23

## 2024-01-23 RX ORDER — DEXMEDETOMIDINE HYDROCHLORIDE 4 UG/ML
INJECTION, SOLUTION INTRAVENOUS CONTINUOUS PRN
Status: DISCONTINUED | OUTPATIENT
Start: 2024-01-23 | End: 2024-01-23

## 2024-01-23 RX ORDER — CEFAZOLIN SODIUM 2 G/100ML
INJECTION, SOLUTION INTRAVENOUS AS NEEDED
Status: DISCONTINUED | OUTPATIENT
Start: 2024-01-23 | End: 2024-01-23

## 2024-01-23 RX ORDER — KETOROLAC TROMETHAMINE 30 MG/ML
INJECTION, SOLUTION INTRAMUSCULAR; INTRAVENOUS AS NEEDED
Status: DISCONTINUED | OUTPATIENT
Start: 2024-01-23 | End: 2024-01-23

## 2024-01-23 RX ORDER — SODIUM CHLORIDE, SODIUM LACTATE, POTASSIUM CHLORIDE, CALCIUM CHLORIDE 600; 310; 30; 20 MG/100ML; MG/100ML; MG/100ML; MG/100ML
INJECTION, SOLUTION INTRAVENOUS CONTINUOUS PRN
Status: DISCONTINUED | OUTPATIENT
Start: 2024-01-23 | End: 2024-01-23

## 2024-01-23 RX ORDER — DEXAMETHASONE SODIUM PHOSPHATE 4 MG/ML
INJECTION, SOLUTION INTRA-ARTICULAR; INTRALESIONAL; INTRAMUSCULAR; INTRAVENOUS; SOFT TISSUE AS NEEDED
Status: DISCONTINUED | OUTPATIENT
Start: 2024-01-23 | End: 2024-01-23

## 2024-01-23 RX ADMIN — KETOROLAC TROMETHAMINE 30 MG: 30 INJECTION, SOLUTION INTRAMUSCULAR; INTRAVENOUS at 21:38

## 2024-01-23 RX ADMIN — Medication 10 ML/HR: at 02:00

## 2024-01-23 RX ADMIN — Medication 10 ML/HR: at 09:53

## 2024-01-23 RX ADMIN — SODIUM CHLORIDE, POTASSIUM CHLORIDE, SODIUM LACTATE AND CALCIUM CHLORIDE 500 ML: 600; 310; 30; 20 INJECTION, SOLUTION INTRAVENOUS at 01:50

## 2024-01-23 RX ADMIN — LIDOCAINE HYDROCHLORIDE 100 MG: 20 INJECTION, SOLUTION EPIDURAL; INFILTRATION; INTRACAUDAL; PERINEURAL at 18:14

## 2024-01-23 RX ADMIN — LIDOCAINE HYDROCHLORIDE 100 MG: 20 INJECTION, SOLUTION EPIDURAL; INFILTRATION; INTRACAUDAL; PERINEURAL at 19:45

## 2024-01-23 RX ADMIN — OXYTOCIN 600 MILLI-UNITS/MIN: 10 INJECTION, SOLUTION INTRAMUSCULAR; INTRAVENOUS at 21:25

## 2024-01-23 RX ADMIN — SODIUM CHLORIDE, POTASSIUM CHLORIDE, SODIUM LACTATE AND CALCIUM CHLORIDE 125 ML/HR: 600; 310; 30; 20 INJECTION, SOLUTION INTRAVENOUS at 14:36

## 2024-01-23 RX ADMIN — SODIUM CHLORIDE, POTASSIUM CHLORIDE, SODIUM LACTATE AND CALCIUM CHLORIDE: 600; 310; 30; 20 INJECTION, SOLUTION INTRAVENOUS at 20:48

## 2024-01-23 RX ADMIN — ACETAMINOPHEN 650 MG: 120 SUPPOSITORY RECTAL at 22:07

## 2024-01-23 RX ADMIN — LIDOCAINE HYDROCHLORIDE 100 MG: 20 INJECTION, SOLUTION EPIDURAL; INFILTRATION; INTRACAUDAL; PERINEURAL at 21:41

## 2024-01-23 RX ADMIN — PROPOFOL 20 MG: 10 INJECTION, EMULSION INTRAVENOUS at 21:46

## 2024-01-23 RX ADMIN — AZITHROMYCIN 500 MG: 500 INJECTION, POWDER, LYOPHILIZED, FOR SOLUTION INTRAVENOUS at 21:15

## 2024-01-23 RX ADMIN — LIDOCAINE HYDROCHLORIDE 100 MG: 20 INJECTION, SOLUTION EPIDURAL; INFILTRATION; INTRACAUDAL; PERINEURAL at 20:52

## 2024-01-23 RX ADMIN — Medication 10 ML/HR: at 16:40

## 2024-01-23 RX ADMIN — ONDANSETRON 4 MG: 2 INJECTION, SOLUTION INTRAMUSCULAR; INTRAVENOUS at 20:52

## 2024-01-23 RX ADMIN — LIDOCAINE HYDROCHLORIDE 100 MG: 20 INJECTION, SOLUTION EPIDURAL; INFILTRATION; INTRACAUDAL; PERINEURAL at 16:41

## 2024-01-23 RX ADMIN — Medication 2 MILLI-UNITS/MIN: at 01:39

## 2024-01-23 RX ADMIN — Medication 100 MCG: at 21:42

## 2024-01-23 RX ADMIN — MORPHINE SULFATE 2 MG: 1 INJECTION, SOLUTION EPIDURAL; INTRATHECAL; INTRAVENOUS at 21:56

## 2024-01-23 RX ADMIN — CEFAZOLIN SODIUM 2 G: 2 INJECTION, SOLUTION INTRAVENOUS at 20:54

## 2024-01-23 RX ADMIN — DEXMEDETOMIDINE HYDROCHLORIDE 15 MCG: 100 INJECTION, SOLUTION, CONCENTRATE INTRAVENOUS at 21:34

## 2024-01-23 RX ADMIN — LIDOCAINE HYDROCHLORIDE 100 MG: 20 INJECTION, SOLUTION EPIDURAL; INFILTRATION; INTRACAUDAL; PERINEURAL at 20:30

## 2024-01-23 RX ADMIN — CEFAZOLIN SODIUM 1 G: 1 INJECTION, SOLUTION INTRAVENOUS at 21:09

## 2024-01-23 RX ADMIN — LIDOCAINE HYDROCHLORIDE 100 MG: 20 INJECTION, SOLUTION EPIDURAL; INFILTRATION; INTRACAUDAL; PERINEURAL at 21:02

## 2024-01-23 RX ADMIN — SODIUM CHLORIDE, POTASSIUM CHLORIDE, SODIUM LACTATE AND CALCIUM CHLORIDE 125 ML/HR: 600; 310; 30; 20 INJECTION, SOLUTION INTRAVENOUS at 19:02

## 2024-01-23 RX ADMIN — DEXMEDETOMIDINE HYDROCHLORIDE 15 MCG: 100 INJECTION, SOLUTION, CONCENTRATE INTRAVENOUS at 21:26

## 2024-01-23 RX ADMIN — MISOPROSTOL 800 MCG: 200 TABLET ORAL at 22:38

## 2024-01-23 RX ADMIN — CARBOPROST TROMETHAMINE 250 MCG: 250 INJECTION, SOLUTION INTRAMUSCULAR at 22:30

## 2024-01-23 RX ADMIN — TRANEXAMIC ACID 1000 MG: 100 INJECTION INTRAVENOUS at 21:27

## 2024-01-23 RX ADMIN — FENTANYL CITRATE 100 MCG: 50 INJECTION, SOLUTION INTRAMUSCULAR; INTRAVENOUS at 20:30

## 2024-01-23 RX ADMIN — DEXAMETHASONE SODIUM PHOSPHATE 4 MG: 4 INJECTION, SOLUTION INTRAMUSCULAR; INTRAVENOUS at 21:31

## 2024-01-23 RX ADMIN — SCOPOLAMINE 1 PATCH: 1.5 PATCH, EXTENDED RELEASE TRANSDERMAL at 21:52

## 2024-01-23 RX ADMIN — SODIUM CHLORIDE, POTASSIUM CHLORIDE, SODIUM LACTATE AND CALCIUM CHLORIDE 125 ML/HR: 600; 310; 30; 20 INJECTION, SOLUTION INTRAVENOUS at 06:01

## 2024-01-23 RX ADMIN — LIDOCAINE HYDROCHLORIDE 100 MG: 20 INJECTION, SOLUTION EPIDURAL; INFILTRATION; INTRACAUDAL; PERINEURAL at 20:33

## 2024-01-23 RX ADMIN — SODIUM CHLORIDE, POTASSIUM CHLORIDE, SODIUM LACTATE AND CALCIUM CHLORIDE 125 ML/HR: 600; 310; 30; 20 INJECTION, SOLUTION INTRAVENOUS at 01:33

## 2024-01-23 SDOH — HEALTH STABILITY: MENTAL HEALTH: CURRENT SMOKER: 0

## 2024-01-23 ASSESSMENT — PAIN SCALES - GENERAL
PAINLEVEL_OUTOF10: 4
PAINLEVEL_OUTOF10: 0 - NO PAIN
PAINLEVEL_OUTOF10: 3
PAINLEVEL_OUTOF10: 5 - MODERATE PAIN
PAINLEVEL_OUTOF10: 2
PAINLEVEL_OUTOF10: 0 - NO PAIN
PAINLEVEL_OUTOF10: 6
PAINLEVEL_OUTOF10: 0 - NO PAIN
PAINLEVEL_OUTOF10: 0 - NO PAIN
PAINLEVEL_OUTOF10: 2
PAINLEVEL_OUTOF10: 0 - NO PAIN
PAINLEVEL_OUTOF10: 1
PAINLEVEL_OUTOF10: 3
PAINLEVEL_OUTOF10: 0 - NO PAIN
PAINLEVEL_OUTOF10: 10 - WORST POSSIBLE PAIN
PAINLEVEL_OUTOF10: 0 - NO PAIN
PAINLEVEL_OUTOF10: 7
PAINLEVEL_OUTOF10: 7
PAINLEVEL_OUTOF10: 0 - NO PAIN

## 2024-01-23 NOTE — PROGRESS NOTES
CNM update--CRB fell out in the last 1/2hour and patient states she is more comfortable with it out though she is still having a few contractions; FHR Category I; [place]: q3-5'; cervix /-2 with BBOW; discussed starting pitocin for labor at this time and patient fully accepts this recommendation--will start at 0130 as that is 4hrs after Cytotec; patient also strongly desires epidural so will ask CRNA to place before AROM and patient prefers that; anticipate progress to .

## 2024-01-23 NOTE — PROGRESS NOTES
"S: Comfortable with epidural, not feeling ctx. Denies HA, scotoma, SOB, or RUQ pain.     O:   /56   Pulse 86   Temp 37.1 °C (98.8 °F) (Oral)   Resp 16   Ht 1.626 m (5' 4.02\")   Wt 126 kg (278 lb 10.6 oz)   LMP 04/15/2023   SpO2 98%   BMI 47.81 kg/m²     FHT: 150, moderate variability, + accels, +shallow recurrent decels  TOCO: ctx q2 min  SVE: 5/80/-2, IUPC and FSE placed    A/P: 18 y.o. G1 @ 40w3d admitted for term IOL    IOL  -Latent labor  -ROM'd on pit for 12hr now with no change in exam. Did discuss recommendation for  if still in latent labor at 18hr ROM'd on pit given low likelihood of achieving  and increased risk of IAI and PPH. Patient would be amenable at that time  -Category 2 tracing for recurrent shallow lates but otherwise reassuring with moderate variability. Internals placed given difficulty monitoring ctx. Repositioned to right lateral decubitus with immediate resolution of tracing to category 1 with moderate variability and +accels. Pitocin initially paused due to lates. Will restart at half in 30 minutes if tracing remains reassuring  -Admission hgb 11.6, to T&C given BMI, pitocin, and new diagnosis of HDP    gHTN  -Now with mild range BPs >4hr apart  -P:C from [facility] pending  -Asymptomatic  -Reviewed diagnosis with patient    Thelma Rosenthal MD   "

## 2024-01-23 NOTE — PROGRESS NOTES
Intrapartum Progress Note    Assessment/Plan   Kary Crooks is a 18 y.o.  at 40w3d. CASI: 2024, by Last Menstrual Period.     IOL  -Latent labor  -ROM'd on pitocin since 220 this AM  -Current exam /-2, no change from prior at time of ROM  -Pit at 14, continue to uptitrate per protocol  -Admission hgb 11.6, T&S active  -GBS negative  -CEFM, cat 2 currently for intermittent variable decels and rare lates but otherwise reassuring with moderate variability and +accels    R/o hypertenisve disease of pregnancy  -Mild range BPs < 4hr apart  -Asymptomatic  -HELLP labs neg, P:C deferred given ROM'd  -Not currently meeting criteria for HDP     Thelma Rosenthal MD     Principal Problem:    Term pregnancy    Pregnancy Problems (from 23 to present)       Problem Noted Resolved    Term pregnancy 2024 by John Sahu MD No    Priority:  Medium      BMI 40.0-44.9, adult (CMS/HCC) 2023 by Shi Zuniga MD No    Priority:  Medium      Overview Signed 2023 10:19 AM by Shi Zuniga MD     Prepreg BMI 35 / do NST weekly at 36 weeks          Congenital branchial cleft cyst 2023 by Estefany Rubio MD No    Overview Signed 2023 10:11 AM by Shi Zuniga MD     Had surgery at 30 weeks pregnancy to drain neck cyst         30 weeks gestation of pregnancy 2023 by Shi Zuniga MD 2023 by Shi Zuniga MD            Subjective   Comfortable with epidural, not feeling ctx    Objective   Last Vitals:  Temp Pulse Resp BP MAP Pulse Ox   36.5 °C (97.7 °F) 86 20 118/77   99 %     Vitals Min/Max Last 24 Hours:  Temp  Min: 36.5 °C (97.7 °F)  Max: 36.9 °C (98.4 °F)  Pulse  Min: 71  Max: 115  Resp  Min: 16  Max: 20  BP  Min: 110/53  Max: 143/85    Intake/Output:    Intake/Output Summary (Last 24 hours) at 2024 0906  Last data filed at 2024 0530  Gross per 24 hour   Intake --   Output 200 ml   Net -200 ml       Physical Examination:  Gen:  awake, alert  Head: NCAT  HEENT: moist mucus membranes  Pulm: breathing comfortably on room air  CV: warm and well-perfused  Abd: gravid  Neuro: alert and oriented  Psych: appropriate affect     FHT: 140, moderate variability, + accels, +intermittent variable decels, rare late decels  TOCO: ctx q2 min  SVE: 5/80/-2    Lab Review:  Labs in chart were reviewed.

## 2024-01-23 NOTE — PROGRESS NOTES
CNM update--epidural in place and patient comfortable; FHR Category I; [place]: q2-4'; cervix 6/80/-2 with BBOW; AROM with patient consent for moderate amount clear, odorless fluid; anticipate progress and .

## 2024-01-23 NOTE — ANESTHESIA PROCEDURE NOTES
Epidural Block    Patient location during procedure: OB  Start time: 1/23/2024 5:47 PM  Reason for block: labor analgesia  Staffing  Performed: CRNA   Authorized by: SHEELA Amaro    Performed by: SHEELA Amaro    Preanesthetic Checklist  Completed: patient identified, IV checked, risks and benefits discussed, surgical consent, pre-op evaluation, timeout performed and sterile techniques followed  Block Timeout  RN/Licensed healthcare professional reads aloud to the Anesthesia provider and entire team: Patient identity, procedure with side and site, patient position, and as applicable the availability of implants/special equipment/special requirements.  Patient on coagulant treatment: no  Timeout performed at: 1/23/2024 5:49 PM  Block Placement  Patient position: sitting  Prep: ChloraPrep  Sterility prep: hand, mask, cap, gloves and drape  Sedation level: no sedation  Patient monitoring: blood pressure, continuous pulse oximetry and heart rate  Approach: midline  Local numbing: lidocaine 1% to skin and subcutaneous tissues  Vertebral space: lumbar  Lumbar location: L3-L4  Epidural  Loss of resistance technique: saline  Guidance: landmark technique        Needle  Needle type: Tuohy   Needle gauge: 17  Needle length: 10.2 cm  Needle insertion depth: 8 cm  Catheter type: end hole  Catheter size: 19 G  Catheter at skin depth: 13 cm  Catheter securement method: clear occlusive dressing    Test dose: lidocaine 1.5% with epinephrine 1-to-200,000  Test dose given at 1/23/2024 6:06 PM  Test dose: lidocaine 1.5% with epinephrine 1-to-200,000  Test dose result: no positive test dose    PCEA  Medication concentration used: 0.2% Ropivacaine with 2 mcg/mL Fentanyl  Dose (mL): 5  Lockout (minutes): 20  1-Hour Limit (boluses/hr): 3  Basal Rate: 10        Assessment  Block outcome: pain improved  Number of attempts: 1  Events: no positive test dose and negative heme/CSF/paresthesia  Procedure assessment:  patient tolerated procedure well with no immediate complications  Additional Notes  Patient with pain 10/10.  Epidural catheter at 12 cm (MANJEET with first epidural placement was 10).  Discussed dosing the epidural with the patient or replacing the epidural.

## 2024-01-23 NOTE — ANESTHESIA PROCEDURE NOTES
Epidural Block    Patient location during procedure: OB  Start time: 1/23/2024 1:41 AM  End time: 1/23/2024 2:05 AM  Reason for block: labor analgesia  Staffing  Performed: CRNA   Authorized by: SHEELA Leahy    Performed by: SHEELA Leahy    Preanesthetic Checklist  Completed: patient identified, IV checked, risks and benefits discussed, surgical consent, pre-op evaluation, timeout performed and sterile techniques followed  Block Timeout  RN/Licensed healthcare professional reads aloud to the Anesthesia provider and entire team: Patient identity, procedure with side and site, patient position, and as applicable the availability of implants/special equipment/special requirements.  Patient on coagulant treatment: no  Timeout performed at: 1/23/2024 1:41 AM  Block Placement  Patient position: sitting  Prep: ChloraPrep  Sterility prep: mask, hand, gloves, drape and cap  Sedation level: no sedation  Patient monitoring: heart rate, continuous pulse oximetry and blood pressure  Approach: midline  Local numbing: lidocaine 1% to skin and subcutaneous tissues  Vertebral space: lumbar  Lumbar location: L3-L4  Epidural  Loss of resistance technique: saline  Guidance: landmark technique        Needle  Needle type: Tuohy   Needle gauge: 18  Needle length: 8.9cm  Needle insertion depth: 10 cm  Catheter type: multi-orifice  Catheter size: 20 G  Catheter at skin depth: 15 cm  Catheter securement method: clear occlusive dressing and surgical tape    Test dose: lidocaine 1.5% with epinephrine 1-to-200,000  Test dose given at 1/23/2024 1:56 AM  Test dose: lidocaine 1.5% with epinephrine 1-to-200,000  Test dose result: no positive test dose    PCEA  Medication concentration used: 0.2% Ropivacaine with 2 mcg/mL Fentanyl  Dose (mL): 5  Lockout (minutes): 20  1-Hour Limit (boluses/hr): 3  Basal Rate: 10        Assessment  Sensory level: T6 bilateral  Block outcome: patient comfortable  Number of attempts:  1  Events: no positive test dose  Procedure assessment: patient tolerated procedure well with no immediate complications

## 2024-01-23 NOTE — PROGRESS NOTES
CNM update--asked to see patient for exam as she declines male provider; cervix 1-2/60/-2; FHR Category I; CNM reviewed available methods for induction with patient and she agrees with whatever is recommended; CRB placed without difficulty and inflated with 60ccNS and taped to thigh; Cytotec 25mcg placed in posterior vaginal fornix without difficulty; patient tolerated well; status update to Dr Sahu.

## 2024-01-23 NOTE — ANESTHESIA PREPROCEDURE EVALUATION
Patient: Kary Crooks    Evaluation Method: In-person visit    Procedure Information    Date: 01/23/24  Procedure: Labor Analgesia         Relevant Problems   No relevant active problems       Clinical information reviewed:    Allergies  Meds               NPO Detail:  No data recorded     OB/GYN     Physical Exam    Airway  Mallampati: II     Cardiovascular - normal exam     Dental    Pulmonary - normal exam     Abdominal          Anesthesia Plan    History of general anesthesia?: yes  History of complications of general anesthesia?: no    ASA 3     epidural   (I informed and discussed the risks and benefits of general, spinal and epidural anesthesia with the patient.  The patient expressed her understanding and her questions were answered.  A verbal consent was given by the patient.  Patient has no history of problems with anesthesia  Patient has no family history of problems with anesthesia)  The patient is not a current smoker.    Anesthetic plan and risks discussed with patient.  Use of blood products discussed with patient who consented to blood products.

## 2024-01-23 NOTE — ANESTHESIA PREPROCEDURE EVALUATION
Patient: Kary Crooks    Evaluation Method: In-person visit    Procedure Information    Date: 01/23/24  Procedure: Labor Consult         Relevant Problems   No relevant active problems       Clinical information reviewed:    Allergies                NPO Detail:  No data recorded     OB/GYN     Physical Exam    Airway  Mallampati: II     Cardiovascular - normal exam     Dental    Pulmonary - normal exam     Abdominal            Anesthesia Plan    History of general anesthesia?: yes  History of complications of general anesthesia?: no    ASA 3     epidural   (I informed and discussed the risks and benefits of general, spinal and epidural anesthesia with the patient.  The patient expressed her understanding and her questions were answered.  A verbal consent was given by the patient.  Patient has no history of problems with anesthesia  Patient has no family history of problems with anesthesia)  The patient is not a current smoker.    Anesthetic plan and risks discussed with patient.  Use of blood products discussed with patient who consented to blood products.

## 2024-01-23 NOTE — H&P
Obstetrical Admission History and Physical     Kary Crooks is a 18 y.o.  at 40w3d. CASI: 2024, by Last Menstrual Period. Estimated fetal weight: 8-6. She has had prenatal care with Dr Zuniga .    Chief Complaint: No chief complaint on file.    Assessment/Plan    Induction of Labor - Cytotec and/or CRB    Principal Problem:    Term pregnancy      Pregnancy Problems (from 23 to present)       Problem Noted Resolved    Term pregnancy 2024 by John Sahu MD No    Priority:  Medium      BMI 40.0-44.9, adult (CMS/[facility]) 2023 by Shi Zuniga MD No    Priority:  Medium      Overview Signed 2023 10:19 AM by Shi Zuniga MD     Prepreg BMI 35 / do NST weekly at 36 weeks          Congenital branchial cleft cyst 2023 by Estefany Rubio MD No    Overview Signed 2023 10:11 AM by Shi Zuniga MD     Had surgery at 30 weeks pregnancy to drain neck cyst         30 weeks gestation of pregnancy 2023 by Shi Zuniga MD 2023 by Shi Zuniga MD          Options for delivery have been discussed with the patient and she elects for an induction of labor.  Cervical ripening with cytotec, cervidil, other prostaglandin agents has been discussed.  Induction of labor with pitocin, amniotomy, cytotec, and cervical balloon have been discussed in detail. The risks, benefits, complications, alternatives, expected outcomes, potential problems during recuperation and recovery, and the risks of not performing the procedure were discussed with the patient. The patient stated understanding that the risks of delivery include, but are not limited to: death; reaction to medications; injury to bowel, bladder, ureters, uterus, cervix, vagina, and other pelvic and abdominal structures, infection; blood loss and possible need for transfusion; and potential need for surgery, including hysterectomy. The risks of injury to the infant during delivery were also  discussed. All questions were answered. There was concurrence with the planned procedure, and the patient wanted to proceed.    Admit to inpatient status. I anticipate that this patient will require a stay exceeding at least 2 midnights for delivery and postpartum.  Induction of labor.  Management of pregnancy complications, as indicated.    Subjective   Good fetal movement. Denies vaginal bleeding., Denies contractions., Denies leaking of fluid.       Reason for Induction of Labor:  Pregnancy at 39 weeks or greater for induction         Obstetrical History   OB History    Para Term  AB Living   1 0 0 0 0 0   SAB IAB Ectopic Multiple Live Births   0 0 0 0 0      # Outcome Date GA Lbr Miky/2nd Weight Sex Delivery Anes PTL Lv   1 Current                Past Medical History  Past Medical History:   Diagnosis Date    H/O removal of neck cyst         Past Surgical History   No past surgical history on file.    Social History  Social History     Tobacco Use    Smoking status: Never    Smokeless tobacco: Not on file   Substance Use Topics    Alcohol use: Not Currently     Substance and Sexual Activity   Drug Use Yes    Types: Marijuana       Allergies  Patient has no known allergies.     Medications  Medications Prior to Admission   Medication Sig Dispense Refill Last Dose    PNV85-iron-folic acid-dha-fish 40-10-1-300 mg capsule Take 1 capsule by mouth once daily.   2024       Objective    Last Vitals  Temp Pulse Resp BP MAP O2 Sat   36.6 °C (97.9 °F) 102 16 122/70   97 %     Physical Examination  GENERAL: Examination reveals a well developed, well nourished and obese, gravid female in no acute distress. She is alert and cooperative.  LUNGS: clear to auscultation bilaterally  HEART: regular rate and rhythm, S1, S2 normal, no murmur, click, rub or gallop  ABDOMEN: soft, gravid, nontender, nondistended, no abnormal masses, no epigastric pain  FHR is  , with Accelerations, and a Category I tracing.     EXTREMITIES: no redness or tenderness in the calves or thighs, no edema  SKIN: normal coloration and turgor, no rashes  NEUROLOGICAL: alert, oriented, normal speech, no focal findings or movement disorder noted  PSYCHOLOGICAL: awake and alert; oriented to person, place, and time    Lab Review  Labs in chart were reviewed.

## 2024-01-23 NOTE — PROGRESS NOTES
More uncomfortable, complaining of constant low back pain. Category 1 tracing. Ctx q2 min on pit of 12. SVE unchanged at 5/80/-2. Anesthesia at bedside, epidural to be replaced. To aggressively increase pitocin every 30 minutes once epidural replaced and plan to recheck at 18hr ROM'd. Is now meeting criteria for PEC w/o severe features by P:C 0.7. HELLP labs previously negative.     Thelma Rosenthal MD

## 2024-01-24 ENCOUNTER — APPOINTMENT (OUTPATIENT)
Dept: OBSTETRICS AND GYNECOLOGY | Facility: CLINIC | Age: 19
End: 2024-01-24
Payer: COMMERCIAL

## 2024-01-24 PROBLEM — O26.893 RH NEGATIVE STATUS DURING PREGNANCY IN THIRD TRIMESTER (HHS-HCC): Status: ACTIVE | Noted: 2024-01-24

## 2024-01-24 PROBLEM — Z67.91 RH NEGATIVE STATUS DURING PREGNANCY IN THIRD TRIMESTER (HHS-HCC): Status: ACTIVE | Noted: 2024-01-24

## 2024-01-24 LAB
ERYTHROCYTE [DISTWIDTH] IN BLOOD BY AUTOMATED COUNT: 13.6 % (ref 11.5–14.5)
FETAL MATERNAL SCREEN: NORMAL
HCT VFR BLD AUTO: 27.9 % (ref 36–46)
HGB BLD-MCNC: 9.1 G/DL (ref 12–16)
MCH RBC QN AUTO: 26.9 PG (ref 26–34)
MCHC RBC AUTO-ENTMCNC: 32.6 G/DL (ref 32–36)
MCV RBC AUTO: 83 FL (ref 80–100)
NRBC BLD-RTO: 0 /100 WBCS (ref 0–0)
PLATELET # BLD AUTO: 241 X10*3/UL (ref 150–450)
RBC # BLD AUTO: 3.38 X10*6/UL (ref 4–5.2)
WBC # BLD AUTO: 22.2 X10*3/UL (ref 4.4–11.3)

## 2024-01-24 PROCEDURE — 85461 HEMOGLOBIN FETAL: CPT

## 2024-01-24 PROCEDURE — 2500000001 HC RX 250 WO HCPCS SELF ADMINISTERED DRUGS (ALT 637 FOR MEDICARE OP): Performed by: OBSTETRICS & GYNECOLOGY

## 2024-01-24 PROCEDURE — 36415 COLL VENOUS BLD VENIPUNCTURE: CPT | Performed by: OBSTETRICS & GYNECOLOGY

## 2024-01-24 PROCEDURE — 85027 COMPLETE CBC AUTOMATED: CPT | Performed by: OBSTETRICS & GYNECOLOGY

## 2024-01-24 PROCEDURE — 1120000001 HC OB PRIVATE ROOM DAILY

## 2024-01-24 PROCEDURE — 2500000004 HC RX 250 GENERAL PHARMACY W/ HCPCS (ALT 636 FOR OP/ED): Performed by: OBSTETRICS & GYNECOLOGY

## 2024-01-24 RX ORDER — ONDANSETRON HYDROCHLORIDE 2 MG/ML
4 INJECTION, SOLUTION INTRAVENOUS EVERY 6 HOURS PRN
Status: DISCONTINUED | OUTPATIENT
Start: 2024-01-24 | End: 2024-01-25 | Stop reason: HOSPADM

## 2024-01-24 RX ORDER — LIDOCAINE 560 MG/1
1 PATCH PERCUTANEOUS; TOPICAL; TRANSDERMAL
Status: DISCONTINUED | OUTPATIENT
Start: 2024-01-24 | End: 2024-01-25 | Stop reason: HOSPADM

## 2024-01-24 RX ORDER — IBUPROFEN 600 MG/1
600 TABLET ORAL EVERY 6 HOURS
Status: DISCONTINUED | OUTPATIENT
Start: 2024-01-24 | End: 2024-01-25 | Stop reason: HOSPADM

## 2024-01-24 RX ORDER — ONDANSETRON 4 MG/1
4 TABLET, FILM COATED ORAL EVERY 6 HOURS PRN
Status: DISCONTINUED | OUTPATIENT
Start: 2024-01-24 | End: 2024-01-25 | Stop reason: HOSPADM

## 2024-01-24 RX ORDER — MAGNESIUM HYDROXIDE 2400 MG/10ML
10 SUSPENSION ORAL
Status: DISCONTINUED | OUTPATIENT
Start: 2024-01-24 | End: 2024-01-25 | Stop reason: HOSPADM

## 2024-01-24 RX ORDER — OXYCODONE HYDROCHLORIDE 5 MG/1
5 TABLET ORAL EVERY 4 HOURS PRN
Status: DISCONTINUED | OUTPATIENT
Start: 2024-01-24 | End: 2024-01-25 | Stop reason: HOSPADM

## 2024-01-24 RX ORDER — SIMETHICONE 80 MG
80 TABLET,CHEWABLE ORAL 4 TIMES DAILY PRN
Status: DISCONTINUED | OUTPATIENT
Start: 2024-01-24 | End: 2024-01-25 | Stop reason: HOSPADM

## 2024-01-24 RX ORDER — POLYETHYLENE GLYCOL 3350 17 G/17G
17 POWDER, FOR SOLUTION ORAL 2 TIMES DAILY PRN
Status: DISCONTINUED | OUTPATIENT
Start: 2024-01-24 | End: 2024-01-25 | Stop reason: HOSPADM

## 2024-01-24 RX ORDER — LABETALOL HYDROCHLORIDE 5 MG/ML
20 INJECTION, SOLUTION INTRAVENOUS ONCE AS NEEDED
Status: DISCONTINUED | OUTPATIENT
Start: 2024-01-24 | End: 2024-01-25 | Stop reason: HOSPADM

## 2024-01-24 RX ORDER — OXYCODONE HYDROCHLORIDE 10 MG/1
10 TABLET ORAL EVERY 4 HOURS PRN
Status: DISCONTINUED | OUTPATIENT
Start: 2024-01-24 | End: 2024-01-25 | Stop reason: HOSPADM

## 2024-01-24 RX ORDER — OXYTOCIN/0.9 % SODIUM CHLORIDE 30/500 ML
60 PLASTIC BAG, INJECTION (ML) INTRAVENOUS ONCE AS NEEDED
Status: DISCONTINUED | OUTPATIENT
Start: 2024-01-24 | End: 2024-01-25 | Stop reason: HOSPADM

## 2024-01-24 RX ORDER — TRANEXAMIC ACID 100 MG/ML
1000 INJECTION, SOLUTION INTRAVENOUS ONCE AS NEEDED
Status: DISCONTINUED | OUTPATIENT
Start: 2024-01-24 | End: 2024-01-25 | Stop reason: HOSPADM

## 2024-01-24 RX ORDER — OXYTOCIN 10 [USP'U]/ML
10 INJECTION, SOLUTION INTRAMUSCULAR; INTRAVENOUS ONCE AS NEEDED
Status: DISCONTINUED | OUTPATIENT
Start: 2024-01-24 | End: 2024-01-25 | Stop reason: HOSPADM

## 2024-01-24 RX ORDER — HYDROMORPHONE HYDROCHLORIDE 1 MG/ML
0.2 INJECTION, SOLUTION INTRAMUSCULAR; INTRAVENOUS; SUBCUTANEOUS EVERY 5 MIN PRN
Status: DISCONTINUED | OUTPATIENT
Start: 2024-01-24 | End: 2024-01-24 | Stop reason: HOSPADM

## 2024-01-24 RX ORDER — DIPHENHYDRAMINE HYDROCHLORIDE 50 MG/ML
25 INJECTION INTRAMUSCULAR; INTRAVENOUS EVERY 4 HOURS PRN
Status: DISCONTINUED | OUTPATIENT
Start: 2024-01-24 | End: 2024-01-25 | Stop reason: HOSPADM

## 2024-01-24 RX ORDER — KETOROLAC TROMETHAMINE 30 MG/ML
30 INJECTION, SOLUTION INTRAMUSCULAR; INTRAVENOUS EVERY 6 HOURS
Status: COMPLETED | OUTPATIENT
Start: 2024-01-24 | End: 2024-01-24

## 2024-01-24 RX ORDER — HYDRALAZINE HYDROCHLORIDE 20 MG/ML
5 INJECTION INTRAMUSCULAR; INTRAVENOUS ONCE AS NEEDED
Status: DISCONTINUED | OUTPATIENT
Start: 2024-01-24 | End: 2024-01-25 | Stop reason: HOSPADM

## 2024-01-24 RX ORDER — MISOPROSTOL 200 UG/1
800 TABLET ORAL ONCE AS NEEDED
Status: DISCONTINUED | OUTPATIENT
Start: 2024-01-24 | End: 2024-01-25 | Stop reason: HOSPADM

## 2024-01-24 RX ORDER — BISACODYL 10 MG/1
10 SUPPOSITORY RECTAL DAILY PRN
Status: DISCONTINUED | OUTPATIENT
Start: 2024-01-24 | End: 2024-01-25 | Stop reason: HOSPADM

## 2024-01-24 RX ORDER — CARBOPROST TROMETHAMINE 250 UG/ML
250 INJECTION, SOLUTION INTRAMUSCULAR ONCE AS NEEDED
Status: DISCONTINUED | OUTPATIENT
Start: 2024-01-24 | End: 2024-01-25 | Stop reason: HOSPADM

## 2024-01-24 RX ORDER — DIPHENHYDRAMINE HCL 25 MG
25 CAPSULE ORAL EVERY 4 HOURS PRN
Status: DISCONTINUED | OUTPATIENT
Start: 2024-01-24 | End: 2024-01-25 | Stop reason: HOSPADM

## 2024-01-24 RX ORDER — METHYLERGONOVINE MALEATE 0.2 MG/ML
0.2 INJECTION INTRAVENOUS ONCE AS NEEDED
Status: DISCONTINUED | OUTPATIENT
Start: 2024-01-24 | End: 2024-01-25 | Stop reason: HOSPADM

## 2024-01-24 RX ORDER — ENOXAPARIN SODIUM 100 MG/ML
60 INJECTION SUBCUTANEOUS EVERY 24 HOURS
Status: DISCONTINUED | OUTPATIENT
Start: 2024-01-24 | End: 2024-01-25 | Stop reason: HOSPADM

## 2024-01-24 RX ORDER — HYDROMORPHONE HYDROCHLORIDE 1 MG/ML
0.2 INJECTION, SOLUTION INTRAMUSCULAR; INTRAVENOUS; SUBCUTANEOUS EVERY 5 MIN PRN
Status: DISCONTINUED | OUTPATIENT
Start: 2024-01-24 | End: 2024-01-25 | Stop reason: HOSPADM

## 2024-01-24 RX ORDER — LOPERAMIDE HYDROCHLORIDE 2 MG/1
4 CAPSULE ORAL EVERY 2 HOUR PRN
Status: DISCONTINUED | OUTPATIENT
Start: 2024-01-24 | End: 2024-01-25 | Stop reason: HOSPADM

## 2024-01-24 RX ORDER — BUTORPHANOL TARTRATE 1 MG/ML
1 INJECTION INTRAMUSCULAR; INTRAVENOUS
Status: DISCONTINUED | OUTPATIENT
Start: 2024-01-24 | End: 2024-01-25 | Stop reason: HOSPADM

## 2024-01-24 RX ORDER — NIFEDIPINE 10 MG/1
10 CAPSULE ORAL ONCE AS NEEDED
Status: DISCONTINUED | OUTPATIENT
Start: 2024-01-24 | End: 2024-01-25 | Stop reason: HOSPADM

## 2024-01-24 RX ORDER — NALOXONE HYDROCHLORIDE 0.4 MG/ML
0.1 INJECTION, SOLUTION INTRAMUSCULAR; INTRAVENOUS; SUBCUTANEOUS EVERY 5 MIN PRN
Status: DISCONTINUED | OUTPATIENT
Start: 2024-01-24 | End: 2024-01-25 | Stop reason: HOSPADM

## 2024-01-24 RX ORDER — ACETAMINOPHEN 325 MG/1
975 TABLET ORAL EVERY 6 HOURS
Status: DISCONTINUED | OUTPATIENT
Start: 2024-01-24 | End: 2024-01-25 | Stop reason: HOSPADM

## 2024-01-24 RX ADMIN — ACETAMINOPHEN 975 MG: 325 TABLET ORAL at 21:31

## 2024-01-24 RX ADMIN — HUMAN RHO(D) IMMUNE GLOBULIN 300 MCG: 300 INJECTION, SOLUTION INTRAMUSCULAR at 15:26

## 2024-01-24 RX ADMIN — ACETAMINOPHEN 975 MG: 325 TABLET ORAL at 03:42

## 2024-01-24 RX ADMIN — ENOXAPARIN SODIUM 60 MG: 60 INJECTION SUBCUTANEOUS at 23:50

## 2024-01-24 RX ADMIN — ACETAMINOPHEN 975 MG: 325 TABLET ORAL at 15:14

## 2024-01-24 RX ADMIN — KETOROLAC TROMETHAMINE 30 MG: 30 INJECTION, SOLUTION INTRAMUSCULAR; INTRAVENOUS at 15:14

## 2024-01-24 RX ADMIN — KETOROLAC TROMETHAMINE 30 MG: 30 INJECTION, SOLUTION INTRAMUSCULAR; INTRAVENOUS at 09:15

## 2024-01-24 RX ADMIN — IBUPROFEN 600 MG: 600 TABLET, FILM COATED ORAL at 21:31

## 2024-01-24 RX ADMIN — ACETAMINOPHEN 975 MG: 325 TABLET ORAL at 09:15

## 2024-01-24 RX ADMIN — KETOROLAC TROMETHAMINE 30 MG: 30 INJECTION, SOLUTION INTRAMUSCULAR; INTRAVENOUS at 03:42

## 2024-01-24 ASSESSMENT — PAIN SCALES - PAIN ASSESSMENT IN ADVANCED DEMENTIA (PAINAD)
CONSOLABILITY: NO NEED TO CONSOLE
TOTALSCORE: 0
FACIALEXPRESSION: SMILING OR INEXPRESSIVE
BODYLANGUAGE: RELAXED
BREATHING: NORMAL

## 2024-01-24 ASSESSMENT — PAIN SCALES - GENERAL
PAINLEVEL_OUTOF10: 0 - NO PAIN
PAINLEVEL_OUTOF10: 0 - NO PAIN
PAINLEVEL_OUTOF10: 3
PAINLEVEL_OUTOF10: 0 - NO PAIN
PAINLEVEL_OUTOF10: 4
PAINLEVEL_OUTOF10: 5 - MODERATE PAIN
PAINLEVEL_OUTOF10: 0 - NO PAIN
PAINLEVEL_OUTOF10: 4
PAIN_LEVEL: 4

## 2024-01-24 NOTE — PROGRESS NOTES
OB Postpartum Progress Note    S:  Patient overall feeling well this AM.  Pain well controlled with tylenol/motrin.  Voiding freely.  Not yet passing flatus.  Denies HA, blurry vision, scotomata, RUQ/epigastric pain.      O:  Vitals:    24 1155   BP: 132/70   Pulse: 100   Resp: 18   Temp: 36.7 °C (98.1 °F)   SpO2: 99%       OBGyn Exam  Gen in NAD  Abdomen soft, appropriately tender, fundus firm at U, dressing 70% saturated with blood, this was removed, no active bleeding noted and aquacell dressing placed  Ext 2+ LE edema    Results for orders placed or performed during the hospital encounter of 24 (from the past 24 hour(s))   CBC   Result Value Ref Range    WBC 22.2 (H) 4.4 - 11.3 x10*3/uL    nRBC 0.0 0.0 - 0.0 /100 WBCs    RBC 3.38 (L) 4.00 - 5.20 x10*6/uL    Hemoglobin 9.1 (L) 12.0 - 16.0 g/dL    Hematocrit 27.9 (L) 36.0 - 46.0 %    MCV 83 80 - 100 fL    MCH 26.9 26.0 - 34.0 pg    MCHC 32.6 32.0 - 36.0 g/dL    RDW 13.6 11.5 - 14.5 %    Platelets 241 150 - 450 x10*3/uL   Fetal/Matern Scr   Result Value Ref Range    FETAL MATERNAL SCREEN NEG          Assessment and Plan  18 y.o. y/o  POD#1 s/p pLTCS for failed IOL c/b PEC without severe features and PPH 1240 ml, doing well.  Routine postpartum  -B neg, baby is Rh pos, needs rhogam  -rubella equiv, MMR prior to discharge  -breast and bottlefeeding  -male infant, desires circ, consented and will perform today   2. Routine postop  -PO pain meds  -hgb 11.6/9.1  -ADAT, IS, ambulate  -lovenox for dvt ppx  3. PPH  -total QBL 1240 ml, hgb appropriate for QBL, continue to monitor closely  4. PEC without severe features  -bps normal to mild range, no need for PO agent at this time, will continue to monitor closely  5. Dispo   -continue inpatient care    Valentine Miller MD

## 2024-01-24 NOTE — L&D DELIVERY NOTE
OB Delivery Note  2024  Kary Crooks  18 y.o.   , Low Transverse        Gestational Age: 40w3d  /Para:   Quantitative Blood Loss: Admission to Discharge: 1240 mL (2024  8:02 PM - 2024 11:08 PM)    Jj Crooks [[id number]]      Labor Events    Rupture date/time: 2024 021  Rupture type: Artificial  Fluid color: Clear  Fluid odor: None  Labor type: Induced Onset of Labor  Labor allowed to proceed with plans for an attempted vaginal birth?: Yes  Induction: Misoprostol, Flynn/EASI, Oxytocin, AROM  Induction indications: Post-term Gestation  Complications: Failure to Progress in Second Stage       Placenta    Placenta delivery date/time: 2024  Placenta removal: Expressed  Placenta appearance: Intact  Placenta disposition: pathology       Cord    Vessels: 3 vessels  Complications: Nuchal  Nuchal cord description: loose nuchal cord  Delayed cord clamping?: Yes  Cord blood disposition: Lab  Gases sent?: No  Stem cell collection (by provider): No       Lacerations    Episiotomy: None  Perineal laceration: None  Other lacerations?: No  Repair suture: None       Anesthesia    Method: Epidural       Operative Delivery    Forceps attempted?: No  Vacuum extractor attempted?: No       Shoulder Dystocia    Shoulder dystocia present?: No       [place] Delivery    Birth date/time: [birth year]24 21:23:00  Delivery type: , Low Transverse   categorization: primary   priority: routine  Indications for : Unsuccessful Induction of Labor  Incision type: low transverse  Complications: Failure to Progress in Second Stage       Resuscitation    Method: Suctioning, Tactile stimulation       Apgars    Living status: Living  Apgar Component Scores:  1 min.:  5 min.:  10 min.:  15 min.:  20 min.:    Skin color:  1  2       Heart rate:  2  2       Reflex irritability:  2  2       Muscle tone:  2  2       Respiratory effort:  2  2       Total:  9  10        Apgars assigned by: QUITA MAHMOOD RN       Delivery Providers    Delivering clinician: Marysol Mccauley MD   Provider Role    Quita Smalls RN Delivery Nurse    Quita Mahmood RN Nursery Nurse     Resident                 Marysol Mccauley MD

## 2024-01-24 NOTE — ANESTHESIA POSTPROCEDURE EVALUATION
Patient: Kary Crooks    Procedure Summary       Date: 24 Room / Location: [facility] [place] [facility] OB    Anesthesia Start: 141 Anesthesia Stop:     Procedure:  Section (Abdomen) Diagnosis:       Arrested active phase of labor      (Arrested active phase of labor [O62.1])    Surgeons: Marysol Mccauley MD Responsible Provider: SHEELA Amaro    Anesthesia Type: epidural ASA Status: 3           40w3d 18 y.o.       Anesthesia Type: epidural    Vitals Value Taken Time   /63 24 0023   Temp 37 °C (98.6 °F) 24 2223   Pulse 78 24 0023   Resp 16 24 0023   SpO2 98 % 24 0019       Anesthesia Post Evaluation    Patient location during evaluation: bedside  Patient participation: complete - patient participated  Level of consciousness: awake and alert  Pain score: 4  Pain management: adequate  Multimodal analgesia pain management approach  Airway patency: patent  Cardiovascular status: acceptable  Respiratory status: acceptable  Hydration status: acceptable  Postoperative Nausea and Vomiting: none  Comments: Epidural catheter removed by nursing.     Complete resolution of numbness. Patient is able to lift legs, bend at the knees, and ambulate.    Patient denies nausea, headache or back pain.        No notable events documented.

## 2024-01-24 NOTE — PROGRESS NOTES
"S: Patient uncomfortable on one side. Not tolerating contractions well.    O:  /72   Pulse 104   Temp 37.3 °C (99.2 °F) (Oral)   Resp 16   Ht 1.626 m (5' 4.02\")   Wt 126 kg (278 lb 10.6 oz)   LMP 04/15/2023   SpO2 100%   BMI 47.81 kg/m²     Gen: NAD  SVE: 6/90/-2, caput present  Membranes: AROM 02:20, clear    FHT: baseline 145, mod variability, +accel with scalp stim, -decels, category 1  IUPC: ctx q2-3min    A/P: 18 y.o.  at 40w3d here for term IOL    IOL  - s/p Cytotec and kuo balloon  - pitocin started at 01:40, currently at 14 mU  - AROM 02:20, clear  - very minimal change on my first exam of the patient    preE w/o SF   - dx by mild range BP and p/c 0.7  - asymptomatic  - HELLP labs wnl    Obesity  - BMI 47.8    Discussed with patient that it has been 18 hours ruptured and on pitocin, which now meets criteria for failed induction of labor. Recommend proceeding with  at this time. Will discontinue pitocin infusion. Discussed risk of surgery including bleeding, infection and injury to surrounding organs. Discussed increased risk of PPH due to prolonged induction and usage of pitocin. Patient consents to blood transfusion if indicated. Given preE, will utilize Hemabate+TXA as first line PPH meds after initial pitocin. Will plan Ancef 3g and azithromycin 500mg for surgical prophylaxis. Anesthesia to come to bedside to obtain adequate analgesia for procedure. Charge nurse notified. Will proceed to the OR when available.       Marysol Mccauley MD  2024 8:28 PM    "

## 2024-01-24 NOTE — OP NOTE
Date: 2024 - 2024  OR Location: [facility] OB    Name: Kary Crooks, : 2005, Age: 18 y.o., MRN: [id number], Sex: female    Diagnosis  Pre-op Diagnosis  IUP at 40w3d  Failed induction of labor  Obesity - BMI 47  Preeclampsia without severe features Same as above  Postpartum hemorrhage     Procedures  Primary  section    Surgeons      * Marysol Mccauley - Primary    Resident/Fellow/Other Assistant:  Surgeon(s) and Role:    Procedure Summary  Anesthesia: epidural  ASA: III  Anesthesia Staff: CRNA: SHEELA Leahy; SHEELA Amaro  Estimated Blood Loss: 1240mL  Intra-op Medications: * Intraprocedure medication information is unavailable because the case start and end events have not been set *      Intraprocedure I/O Totals       None           Specimen: placenta    Staff:   No surgical staff documented.         Informed Consent:  The risks, benefits, complications, and alternatives were discussed with the patient. The patient understood that the risks of  section include, but are not limited to: injury to nearby structures or organs, infection, blood loss and possible need for transfusion, and potential need for more surgery including hysterectomy. The patient stated understanding and desired to proceed. All questions were answered. The site of surgery was properly noted and marked. The patient was identified as Kary Crooks and the procedure verified as a  delivery. A Time Out was held and the above information confirmed.    Procedure Details:  A time-out was performed. The vagina and abdomen were prepped. After waiting the necessary amount of time, the patient was then draped in the normal sterile fashion. Her skin was tested with forcep and she confirmed that she was numb. The skin was incised with a scalpel, and the incision continued until the fascia was reached. The fascia was incised with the scalpel, and was carried laterally using curved Stein  scissors. The superior lip of the fascia was grasped with two Kocher clamps and the fascia was  bluntly and sharply from the underlying muscles. The same procedure was performed on the inferior lip of the fascia. The muscles were then  in the midline using a hemostat until the peritoneum was reached. The peritoneum was entered bluntly. The peritoneum was stretched laterally. A bladder blade was placed. The uterine incision was made with the scalpel and a viable infant was delivered atraumatically. The cord was clamped and cut by the operative assistant 30 seconds after delivery. Upon returning to the uterus, the placenta was noted to have already mostly detached itself from the uterus. Slight uterine massage delivered the remainder of the placenta. Cord gases were not collected. The uterine cavity was manually examined and was noted to be free of further clot and debris. The uterine incision was closed with a single layer of running-locked 0-Vicryl stitches. The uterus was noted to be boggy, so TXA was given. Several running, locked sutures were placed at the lateral aspects for hemostasis. The uterine incision was examined and noted to be hemostatic. Uterine tone was firm. The deep portions of the superior and inferior fascial lips were examined and were noted to be hemostatic. The fascia was then closed with a running non-locked 0-Vicryl suture. The subcutaneous tissue was irrigated and no bleeding was noted. The subcutaneous layer was reapproximated with 3-0 Vicryl in a running fashion. The skin was sewn closed with a 4-0 Monocryl suture. The skin was covered with steristrips and a dressing. The patient tolerated the procedure well. Counts were correct x 2.      Findings:   Viable male infant in JOHN position  Normal fallopian tubes and ovaries  Postpartum hemorrhage due to uterine atony - TXA given    Complications:  None; patient tolerated the procedure well.     Disposition: PACU -  hemodynamically stable.  Condition: stable    Marysol Mccauley MD  1/23/2024 10:45 PM

## 2024-01-24 NOTE — PROGRESS NOTES
Called to bedside for increased postpartum bleeding with fundal check. Hemabate given. Cytotec 800 mcg given rectally. Uterus tone improved. Will continue to monitor closely.    Marysol Mccauley MD  1/23/2024 10:47 PM

## 2024-01-24 NOTE — PROGRESS NOTES
Spiritual Care Visit    Clinical Encounter Type  Visited With: Patient  Routine Visit: Introduction  Continue Visiting: No                                            Taxonomy  Intended Effects: Promote sense of peace, Preserve dignity and respect  Methods: Offer spiritual/Jew support  Interventions: Share words of hope and inspiration    Patient shared she is Hindu and that this is her first baby.   gave her a Bible and prayed and was a supportive presence.

## 2024-01-25 VITALS
HEIGHT: 64 IN | SYSTOLIC BLOOD PRESSURE: 119 MMHG | OXYGEN SATURATION: 99 % | BODY MASS INDEX: 47.57 KG/M2 | HEART RATE: 92 BPM | RESPIRATION RATE: 16 BRPM | TEMPERATURE: 97.9 F | WEIGHT: 278.66 LBS | DIASTOLIC BLOOD PRESSURE: 63 MMHG

## 2024-01-25 LAB
ERYTHROCYTE [DISTWIDTH] IN BLOOD BY AUTOMATED COUNT: 14.1 % (ref 11.5–14.5)
HCT VFR BLD AUTO: 23.7 % (ref 36–46)
HGB BLD-MCNC: 7.4 G/DL (ref 12–16)
MCH RBC QN AUTO: 26.9 PG (ref 26–34)
MCHC RBC AUTO-ENTMCNC: 31.2 G/DL (ref 32–36)
MCV RBC AUTO: 86 FL (ref 80–100)
NRBC BLD-RTO: 0 /100 WBCS (ref 0–0)
PLATELET # BLD AUTO: 211 X10*3/UL (ref 150–450)
RBC # BLD AUTO: 2.75 X10*6/UL (ref 4–5.2)
WBC # BLD AUTO: 13.2 X10*3/UL (ref 4.4–11.3)

## 2024-01-25 PROCEDURE — 85027 COMPLETE CBC AUTOMATED: CPT | Performed by: OBSTETRICS & GYNECOLOGY

## 2024-01-25 PROCEDURE — 2500000001 HC RX 250 WO HCPCS SELF ADMINISTERED DRUGS (ALT 637 FOR MEDICARE OP): Performed by: OBSTETRICS & GYNECOLOGY

## 2024-01-25 PROCEDURE — 36415 COLL VENOUS BLD VENIPUNCTURE: CPT | Performed by: OBSTETRICS & GYNECOLOGY

## 2024-01-25 RX ORDER — ACETAMINOPHEN 500 MG
1000 TABLET ORAL EVERY 6 HOURS PRN
Qty: 30 TABLET | Refills: 0 | Status: SHIPPED | OUTPATIENT
Start: 2024-01-25

## 2024-01-25 RX ORDER — IBUPROFEN 600 MG/1
600 TABLET ORAL EVERY 6 HOURS PRN
Qty: 30 TABLET | Refills: 0 | Status: SHIPPED | OUTPATIENT
Start: 2024-01-25

## 2024-01-25 RX ADMIN — ACETAMINOPHEN 975 MG: 325 TABLET ORAL at 10:02

## 2024-01-25 RX ADMIN — IBUPROFEN 600 MG: 600 TABLET, FILM COATED ORAL at 03:05

## 2024-01-25 RX ADMIN — IBUPROFEN 600 MG: 600 TABLET, FILM COATED ORAL at 10:03

## 2024-01-25 RX ADMIN — ACETAMINOPHEN 975 MG: 325 TABLET ORAL at 03:04

## 2024-01-25 ASSESSMENT — PAIN SCALES - GENERAL
PAINLEVEL_OUTOF10: 0 - NO PAIN
PAINLEVEL_OUTOF10: 0 - NO PAIN

## 2024-01-25 NOTE — CARE PLAN
The patient's goals for the shift include to void well    The clinical goals for the shift include to have wnl postpartum    Over the shift, the patient did make progress toward the following goals. Barriers to progression include na. Recommendations to address these barriers include na.

## 2024-01-25 NOTE — CARE PLAN
The patient's goals for the shift include to void well    The clinical goals for the shift include to have wnl postpartum    Over the shift, the patient did not make progress toward the following goals. Barriers to progression include achieved. Recommendations to address these barriers include pain.

## 2024-01-25 NOTE — NURSING NOTE
Written and verbal discharge instgructions given , postbirth warning signs reviewed, b/p log given and explained. Pt to have an appt next week for b/p check and incision check .Pt states will have help at home with self and baby and verbalizes all discharge instructions and importance of follow up care

## 2024-01-25 NOTE — DISCHARGE SUMMARY
Discharge Summary    Admission Date: 2024  Discharge Date: 24      Discharge Diagnosis  Preeclampsia without severe features, postpartum hemorrhage, anemia    Hospital Course  Delivery Date: 2024  9:23 PM   Delivery type: , Low Transverse    GA at delivery: 40w3d  Outcome: Living   Anesthesia during delivery: Epidural   Intrapartum complications: Failure to Progress in Second Stage   Feeding method: formula     Procedures:   delivery, postpartum hemorrhage  Contraception at discharge:     Postpartum hemorrhage treated with medication.  Receive IV venofer, day of discharge hb 7.4.  Asymptomatic acute blood loss anemia.  Pt advised that per our guidelines, we recommend a 3 d postpartum stay in hypertensive disorders of pregnancy as the blood pressure continues to rise postpartum.  The patient verbalized understanding however desires discharge.  Will discharge to home with blood pressure log and monitor and preeclampsia with severe features checklist.  Pt will have follow up appointment within a week of discharge.    Does not endorse headache, vision change, RUQ pain, dyspnea, or chest pain.  Does not endorse lightheadedness or palpitations or dyspnea.      Pertinent Physical Exam At Time of Discharge  Visit Vitals  /63   Pulse 92   Temp 36.6 °C (97.9 °F) (Oral)   Resp 16       Gen: well nourished, NAD  CV: RRR no murmur  Resp: lungs clear to auscultation b/l  Incision aquacel dressing intact and dry  Abd: softly distended, nontender no organomegaly  Fundus firm nontender below umbilicus  Ext: no edema, nontender, negative Ailyn's        Discharge Meds     Your medication list        START taking these medications        Instructions Last Dose Given Next Dose Due   acetaminophen 500 mg tablet  Commonly known as: Tylenol Extra Strength      Take 2 tablets (1,000 mg) by mouth every 6 hours if needed for mild pain (1 - 3).       ibuprofen 600 mg tablet      Take 1 tablet (600 mg) by  mouth every 6 hours if needed for mild pain (1 - 3).              STOP taking these medications      PNV85-iron-folic acid-dha-fish 40-10-1-300 mg capsule                  Where to Get Your Medications        These medications were sent to [id number] [address] [address] [facility]  [address] [facility] [address]      Phone: [phone]   acetaminophen 500 mg tablet  ibuprofen 600 mg tablet          Complications Requiring Follow-Up  Preeclampsia without severe features  Anemia    Test Results Pending At Discharge  Pending Labs       Order Current Status    Surgical Pathology Exam - [facility] In process            Outpatient Follow-Up  Future Appointments   Date Time Provider Department Center   2/28/2024 11:30 AM Shi Zuniga MD [id number]   3/25/2024  8:45 AM [facility] Christ[id number] spent 30 minutes in the professional and overall care of this patient.      Jeri Quinones MD

## 2024-01-26 LAB
BLOOD EXPIRATION DATE: NORMAL
DISPENSE STATUS: NORMAL
LABORATORY COMMENT REPORT: NORMAL
PATH REPORT.FINAL DX SPEC: NORMAL
PATH REPORT.GROSS SPEC: NORMAL
PATH REPORT.RELEVANT HX SPEC: NORMAL
PATH REPORT.TOTAL CANCER: NORMAL
PRODUCT BLOOD TYPE: 1700
PRODUCT CODE: NORMAL
UNIT ABO: NORMAL
UNIT NUMBER: NORMAL
UNIT RH: NORMAL
UNIT VOLUME: 400
XM INTEP: NORMAL

## 2024-01-30 ENCOUNTER — POSTPARTUM VISIT (OUTPATIENT)
Dept: OBSTETRICS AND GYNECOLOGY | Facility: CLINIC | Age: 19
End: 2024-01-30
Payer: COMMERCIAL

## 2024-01-30 VITALS
HEIGHT: 64 IN | DIASTOLIC BLOOD PRESSURE: 70 MMHG | WEIGHT: 250.38 LBS | BODY MASS INDEX: 42.75 KG/M2 | SYSTOLIC BLOOD PRESSURE: 120 MMHG

## 2024-01-30 DIAGNOSIS — Z09 POSTOP CHECK: ICD-10-CM

## 2024-01-30 PROCEDURE — 99213 OFFICE O/P EST LOW 20 MIN: CPT | Performed by: OBSTETRICS & GYNECOLOGY

## 2024-01-30 RX ORDER — SERTRALINE HYDROCHLORIDE 50 MG/1
50 TABLET, FILM COATED ORAL DAILY
Qty: 30 TABLET | Refills: 3 | Status: SHIPPED | OUTPATIENT
Start: 2024-01-30 | End: 2024-03-04 | Stop reason: SDUPTHER

## 2024-01-30 ASSESSMENT — EDINBURGH POSTNATAL DEPRESSION SCALE (EPDS)
I HAVE FELT SAD OR MISERABLE: NOT VERY OFTEN
I HAVE FELT SCARED OR PANICKY FOR NO GOOD REASON: NO, NOT AT ALL
I HAVE BEEN SO UNHAPPY THAT I HAVE HAD DIFFICULTY SLEEPING: NOT AT ALL
TOTAL SCORE: 5
I HAVE BEEN ANXIOUS OR WORRIED FOR NO GOOD REASON: YES, SOMETIMES
THINGS HAVE BEEN GETTING ON TOP OF ME: NO, I HAVE BEEN COPING AS WELL AS EVER
I HAVE BEEN ABLE TO LAUGH AND SEE THE FUNNY SIDE OF THINGS: AS MUCH AS I ALWAYS COULD
I HAVE LOOKED FORWARD WITH ENJOYMENT TO THINGS: AS MUCH AS I EVER DID
THE THOUGHT OF HARMING MYSELF HAS OCCURRED TO ME: NEVER
I HAVE BLAMED MYSELF UNNECESSARILY WHEN THINGS WENT WRONG: NOT VERY OFTEN
I HAVE BEEN SO UNHAPPY THAT I HAVE BEEN CRYING: ONLY OCCASIONALLY

## 2024-01-30 NOTE — PROGRESS NOTES
"Subjective   Patient ID: Kary Crooks is a 18 y.o. female who presents for Postpartum Follow-up.  after failure to progress on 24 by Marysol Mccauley MD. Baby boy, Maurice, 8lbs 3oz  HPI  1 week post op csection for failed induction / arrested labor at 5 cm x 18 hrs    GHTN not on meds.   Home BPs max 140/80s. Here 120/70         Objective       Recent Vital Signs:                                 /70 (BP Location: Right arm, Patient Position: Sitting)   Ht 1.626 m (5' 4\")   Wt 114 kg (250 lb 6 oz)   LMP 04/15/2023   Breastfeeding No   BMI 42.98 kg/m²       Physical Exam:  General: Examination reveals a well developed, well nourished, female, in no acute distress. She is alert and cooperative.  Abdomen: non-tender, flat, soft with no masses or distension  Incision  : healing well.     Extremities: no redness or tenderness in the calves or thighs, no edema.    Lab Data:  Lab Results   Component Value Date    WBC 13.2 (H) 2024    HGB 7.4 (L) 2024    HCT 23.7 (L) 2024    MCV 86 2024     2024       Assessment/Plan   She is doing well postoperatively. Ambulating, voiding and tolerating a regular diet. Bowel habits are normal. Minimal pain and no Rx meds needed.  Cont checking BPs, call in next week with BPs.   Wants to restart Zoloft for moods/ has help at home.   Rx sent  See me in 5 weeks, wants Nexplanon insert     Keep office exams / annual visits.    Shi Zuniga MD          "

## 2024-02-26 ENCOUNTER — OFFICE VISIT (OUTPATIENT)
Dept: OTOLARYNGOLOGY | Facility: CLINIC | Age: 19
End: 2024-02-26
Payer: COMMERCIAL

## 2024-02-26 VITALS — HEIGHT: 64 IN | BODY MASS INDEX: 42.65 KG/M2 | WEIGHT: 249.8 LBS | TEMPERATURE: 98.6 F

## 2024-02-26 DIAGNOSIS — Q18.0 CONGENITAL BRANCHIAL CLEFT CYST: Primary | ICD-10-CM

## 2024-02-26 PROCEDURE — 99213 OFFICE O/P EST LOW 20 MIN: CPT | Performed by: OTOLARYNGOLOGY

## 2024-02-26 PROCEDURE — 3008F BODY MASS INDEX DOCD: CPT | Performed by: OTOLARYNGOLOGY

## 2024-02-26 RX ORDER — AMOXICILLIN AND CLAVULANATE POTASSIUM 875; 125 MG/1; MG/1
1 TABLET, FILM COATED ORAL 2 TIMES DAILY
Qty: 20 TABLET | Refills: 0 | Status: SHIPPED | OUTPATIENT
Start: 2024-02-26 | End: 2024-02-28

## 2024-02-26 NOTE — H&P (VIEW-ONLY)
ENT Follow up Visit    History Of Present Illness  Kary Crooks is a 18 y.o. female presents for follow up of a branchial cleft cyst.  She was scheduled for reexcision however procedure was canceled the day of the procedure as she found out she was pregnant.  She is due this coming January.      11/13/23: Patient admitted after last visit and taken to the OR for I&D. Cultures negative. Penrose drain left in place. Here for drain removal. Symptoms much improved    2/26/24: returns for follow up. Recently had baby and wants to move forward with excision. No pain but has noticed some swelling over the last week. Had a cold last week     Past Medical History  She has a past medical history of H/O removal of neck cyst.  Patient Active Problem List   Diagnosis    Congenital branchial cleft cyst    BMI 45.0-49.9, adult (CMS/[facility])    Term pregnancy    Arrested active phase of labor    Failed induction of labor    Mild pre-eclampsia in third trimester    Other immediate postpartum hemorrhage    Rh negative status during pregnancy in third trimester       Surgical History  I&d neck abscess     Social History  She reports that she has never smoked. She does not have any smokeless tobacco history on file. She reports that she does not currently use alcohol. She reports current drug use. Drug: Marijuana.    Family History  No family history on file.     Allergies  Patient has no known allergies.     Physical Exam:  CONSTITUTIONAL:  No acute distress  VOICE:  No hoarseness or other abnormality  RESPIRATION:  Breathing comfortably, no stridor  CV:  No clubbing/cyanosis/edema in hands  EYES:  EOM intact, sclera normal  NEURO:  Alert and oriented times 3, Cranial nerves II-XII grossly intact and symmetric bilaterally  HEAD AND FACE:  Symmetric facial features, no masses or lesions, sinuses non-tender to palpation  SALIVARY GLANDS:  Parotid and submandibular glands normal bilaterally  EARS:  Normal external ears, external auditory  canals, and TMs to otoscopy, normal hearing to whispered voice.  NOSE:  External nose midline, anterior rhinoscopy is normal with limited visualization to the anterior aspect of the interior turbinates, no bleeding or drainage, no lesions  ORAL CAVITY/OROPHARYNX/LIPS:  Normal mucous membranes, normal floor of mouth/tongue/OP, no masses or lesions  PHARYNGEAL WALLS:  No masses or lesions  NECK/LYMPH: right neck mass, nontender  PSYCH:  Alert and oriented with appropriate mood and affect  Assessment and Plan  18 y.o. 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  Acute infection was noted at the time of her first procedure therefore incision and drainage was completed.  At the second operation she found out she was pregnant and procedure was canceled. Developed new infection while pregnant that required I&D. Now has delivered baby and interested in proceeding with infection    -Discussed excision. Discussed risks of bleeding, infection, cranial neuropathies, numbness. She understands risk of cyst recurrence  -Will send abx to pharmacy due to recent swelling    Terrance Christy MD

## 2024-02-26 NOTE — PROGRESS NOTES
ENT Follow up Visit    History Of Present Illness  Kary Crooks is a 18 y.o. female presents for follow up of a branchial cleft cyst.  She was scheduled for reexcision however procedure was canceled the day of the procedure as she found out she was pregnant.  She is due this coming January.      11/13/23: Patient admitted after last visit and taken to the OR for I&D. Cultures negative. Penrose drain left in place. Here for drain removal. Symptoms much improved    2/26/24: returns for follow up. Recently had baby and wants to move forward with excision. No pain but has noticed some swelling over the last week. Had a cold last week     Past Medical History  She has a past medical history of H/O removal of neck cyst.  Patient Active Problem List   Diagnosis    Congenital branchial cleft cyst    BMI 45.0-49.9, adult (CMS/[facility])    Term pregnancy    Arrested active phase of labor    Failed induction of labor    Mild pre-eclampsia in third trimester    Other immediate postpartum hemorrhage    Rh negative status during pregnancy in third trimester       Surgical History  I&d neck abscess     Social History  She reports that she has never smoked. She does not have any smokeless tobacco history on file. She reports that she does not currently use alcohol. She reports current drug use. Drug: Marijuana.    Family History  No family history on file.     Allergies  Patient has no known allergies.     Physical Exam:  CONSTITUTIONAL:  No acute distress  VOICE:  No hoarseness or other abnormality  RESPIRATION:  Breathing comfortably, no stridor  CV:  No clubbing/cyanosis/edema in hands  EYES:  EOM intact, sclera normal  NEURO:  Alert and oriented times 3, Cranial nerves II-XII grossly intact and symmetric bilaterally  HEAD AND FACE:  Symmetric facial features, no masses or lesions, sinuses non-tender to palpation  SALIVARY GLANDS:  Parotid and submandibular glands normal bilaterally  EARS:  Normal external ears, external auditory  canals, and TMs to otoscopy, normal hearing to whispered voice.  NOSE:  External nose midline, anterior rhinoscopy is normal with limited visualization to the anterior aspect of the interior turbinates, no bleeding or drainage, no lesions  ORAL CAVITY/OROPHARYNX/LIPS:  Normal mucous membranes, normal floor of mouth/tongue/OP, no masses or lesions  PHARYNGEAL WALLS:  No masses or lesions  NECK/LYMPH: right neck mass, nontender  PSYCH:  Alert and oriented with appropriate mood and affect  Assessment and Plan  18 y.o. 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  Acute infection was noted at the time of her first procedure therefore incision and drainage was completed.  At the second operation she found out she was pregnant and procedure was canceled. Developed new infection while pregnant that required I&D. Now has delivered baby and interested in proceeding with infection    -Discussed excision. Discussed risks of bleeding, infection, cranial neuropathies, numbness. She understands risk of cyst recurrence  -Will send abx to pharmacy due to recent swelling    Terrance Christy MD

## 2024-02-27 DIAGNOSIS — Q18.0 BRANCHIAL CLEFT CYST: ICD-10-CM

## 2024-02-28 ENCOUNTER — APPOINTMENT (OUTPATIENT)
Dept: OBSTETRICS AND GYNECOLOGY | Facility: CLINIC | Age: 19
End: 2024-02-28
Payer: COMMERCIAL

## 2024-02-28 RX ORDER — AMOXICILLIN AND CLAVULANATE POTASSIUM 875; 125 MG/1; MG/1
1 TABLET, FILM COATED ORAL 2 TIMES DAILY
Qty: 20 TABLET | Refills: 0 | Status: SHIPPED | OUTPATIENT
Start: 2024-02-28 | End: 2024-03-04 | Stop reason: SDUPTHER

## 2024-03-04 ENCOUNTER — POSTPARTUM VISIT (OUTPATIENT)
Dept: OBSTETRICS AND GYNECOLOGY | Facility: CLINIC | Age: 19
End: 2024-03-04
Payer: COMMERCIAL

## 2024-03-04 ENCOUNTER — LAB (OUTPATIENT)
Dept: LAB | Facility: LAB | Age: 19
End: 2024-03-04
Payer: COMMERCIAL

## 2024-03-04 VITALS
BODY MASS INDEX: 42.91 KG/M2 | WEIGHT: 251.38 LBS | SYSTOLIC BLOOD PRESSURE: 124 MMHG | DIASTOLIC BLOOD PRESSURE: 78 MMHG | HEIGHT: 64 IN

## 2024-03-04 DIAGNOSIS — Q18.0 CONGENITAL BRANCHIAL CLEFT CYST: ICD-10-CM

## 2024-03-04 DIAGNOSIS — Q18.0 BRANCHIAL CLEFT CYST: ICD-10-CM

## 2024-03-04 DIAGNOSIS — Z30.017 NEXPLANON INSERTION: ICD-10-CM

## 2024-03-04 DIAGNOSIS — Z30.017 NEXPLANON INSERTION: Primary | ICD-10-CM

## 2024-03-04 LAB
ALBUMIN SERPL BCP-MCNC: 3.8 G/DL (ref 3.4–5)
ALP SERPL-CCNC: 105 U/L (ref 33–110)
ALT SERPL W P-5'-P-CCNC: 17 U/L (ref 7–45)
ANION GAP SERPL CALC-SCNC: 10 MMOL/L (ref 10–20)
AST SERPL W P-5'-P-CCNC: 14 U/L (ref 9–39)
B-HCG SERPL-ACNC: <2 MIU/ML
BILIRUB SERPL-MCNC: 0.5 MG/DL (ref 0–1.2)
BUN SERPL-MCNC: 11 MG/DL (ref 6–23)
CALCIUM SERPL-MCNC: 9 MG/DL (ref 8.6–10.3)
CHLORIDE SERPL-SCNC: 106 MMOL/L (ref 98–107)
CO2 SERPL-SCNC: 27 MMOL/L (ref 21–32)
CREAT SERPL-MCNC: 0.65 MG/DL (ref 0.5–1.05)
EGFRCR SERPLBLD CKD-EPI 2021: >90 ML/MIN/1.73M*2
ERYTHROCYTE [DISTWIDTH] IN BLOOD BY AUTOMATED COUNT: 13.6 % (ref 11.5–14.5)
GLUCOSE SERPL-MCNC: 102 MG/DL (ref 74–99)
HCT VFR BLD AUTO: 32.1 % (ref 36–46)
HGB BLD-MCNC: 9.8 G/DL (ref 12–16)
MCH RBC QN AUTO: 24 PG (ref 26–34)
MCHC RBC AUTO-ENTMCNC: 30.5 G/DL (ref 32–36)
MCV RBC AUTO: 79 FL (ref 80–100)
NRBC BLD-RTO: 0 /100 WBCS (ref 0–0)
PLATELET # BLD AUTO: 484 X10*3/UL (ref 150–450)
POTASSIUM SERPL-SCNC: 4.2 MMOL/L (ref 3.5–5.3)
PREGNANCY TEST URINE, POC: NEGATIVE
PROT SERPL-MCNC: 6.4 G/DL (ref 6.4–8.2)
RBC # BLD AUTO: 4.09 X10*6/UL (ref 4–5.2)
SODIUM SERPL-SCNC: 139 MMOL/L (ref 136–145)
WBC # BLD AUTO: 7.7 X10*3/UL (ref 4.4–11.3)

## 2024-03-04 PROCEDURE — 99213 OFFICE O/P EST LOW 20 MIN: CPT | Performed by: OBSTETRICS & GYNECOLOGY

## 2024-03-04 PROCEDURE — 11981 INSERTION DRUG DLVR IMPLANT: CPT | Performed by: OBSTETRICS & GYNECOLOGY

## 2024-03-04 PROCEDURE — 84702 CHORIONIC GONADOTROPIN TEST: CPT

## 2024-03-04 PROCEDURE — 85027 COMPLETE CBC AUTOMATED: CPT

## 2024-03-04 PROCEDURE — 36415 COLL VENOUS BLD VENIPUNCTURE: CPT

## 2024-03-04 PROCEDURE — 81025 URINE PREGNANCY TEST: CPT | Performed by: OBSTETRICS & GYNECOLOGY

## 2024-03-04 PROCEDURE — 80053 COMPREHEN METABOLIC PANEL: CPT

## 2024-03-04 RX ORDER — LIDOCAINE HYDROCHLORIDE 10 MG/ML
2 INJECTION, SOLUTION EPIDURAL; INFILTRATION; INTRACAUDAL; PERINEURAL ONCE
Status: COMPLETED | OUTPATIENT
Start: 2024-03-04 | End: 2024-03-04

## 2024-03-04 RX ORDER — AMOXICILLIN AND CLAVULANATE POTASSIUM 875; 125 MG/1; MG/1
1 TABLET, FILM COATED ORAL 2 TIMES DAILY
Qty: 20 TABLET | Refills: 0 | Status: SHIPPED | OUTPATIENT
Start: 2024-03-04 | End: 2024-03-14

## 2024-03-04 RX ORDER — SERTRALINE HYDROCHLORIDE 50 MG/1
50 TABLET, FILM COATED ORAL DAILY
Qty: 90 TABLET | Refills: 3 | Status: SHIPPED | OUTPATIENT
Start: 2024-03-04 | End: 2024-06-02

## 2024-03-04 RX ADMIN — LIDOCAINE HYDROCHLORIDE 20 MG: 10 INJECTION, SOLUTION EPIDURAL; INFILTRATION; INTRACAUDAL; PERINEURAL at 10:36

## 2024-03-04 ASSESSMENT — EDINBURGH POSTNATAL DEPRESSION SCALE (EPDS)
I HAVE FELT SCARED OR PANICKY FOR NO GOOD REASON: NO, NOT AT ALL
I HAVE FELT SAD OR MISERABLE: NO, NOT AT ALL
I HAVE BEEN SO UNHAPPY THAT I HAVE BEEN CRYING: NO, NEVER
I HAVE BEEN ANXIOUS OR WORRIED FOR NO GOOD REASON: NO, NOT AT ALL
I HAVE BEEN SO UNHAPPY THAT I HAVE HAD DIFFICULTY SLEEPING: NOT AT ALL
I HAVE BEEN ABLE TO LAUGH AND SEE THE FUNNY SIDE OF THINGS: AS MUCH AS I ALWAYS COULD
THINGS HAVE BEEN GETTING ON TOP OF ME: NO, I HAVE BEEN COPING AS WELL AS EVER
TOTAL SCORE: 3
I HAVE LOOKED FORWARD WITH ENJOYMENT TO THINGS: AS MUCH AS I EVER DID
I HAVE BLAMED MYSELF UNNECESSARILY WHEN THINGS WENT WRONG: YES, MOST OF THE TIME
THE THOUGHT OF HARMING MYSELF HAS OCCURRED TO ME: NEVER

## 2024-03-04 NOTE — PROGRESS NOTES
Subjective   Patient ID: Kary Crooks is a 18 y.o. female who presents for Postpartum Follow-up.  after failure to progress on 24 by Marysol Mccauley MD. Baby boy, Maurice, 8lbs 3oz. Patient wants Nexplanon today.      Doing well at home. She has minimal vaginal bleeding, menses started this week .   normal bowel and bladder function. Good appetite with normal diet.   Moods are good. Taking Zoloft 50 mg wants refills.  She feels well-supported at home. She is currently formula feeding. Baby is doing well.      Review of Systems: Negative except as noted above.  Objective:  Vitals:    24 0950   BP: 124/78       Neuro: alert and oriented  Psych: appropriate affect   Breasts: no massses, nontender. No infection or mastitis.   Abdomen: soft, flat, nontender. Incision :   well healed.   Pelvic: no vulvarlesions. Perineum intact.   Cervix normal. No discharge or odor. Uterus : small, nontender. No adnexal masses.   Extremities: no edema, nontender.    Assessment and Plan:    6 week postpartum visit, doing well.  Hx of Gestational diabetes no  Hx of GHTN no  -Contraception: Nexplanon today   -Pap: age 18 N/A    -Postpartum care: may resume normal activities including exercise, work, intercourse  Follow up : Return for annual examination or sooner as needed     Having neck surgery this week for recurrence of branchial cyst. Added BHCG to preop labs.  Also rx for her Augmentin was lost so I sent it.      Shi Zuniga MD    Insertion of Contraceptive Capsule    Date/Time: 3/4/2024 9:51 AM    Performed by: Shi Zuniga MD  Authorized by: Shi Zuniga MD    Consent:     Consent obtained:  Written and verbal    Consent given by:  Patient    Procedural risks discussed:  Bleeding    Patient questions answered: yes      Patient agrees, verbalizes understanding, and wants to proceed: yes    Indication:     Indication: Insertion of non-biodegradable drug delivery implant    Pre-procedure:      Pre-procedure timeout performed: yes      Prepped with: povidone-iodine      Local anesthetic:  Lidocaine 1%    The site was cleaned and prepped in a sterile fashion: yes    Procedure:     Procedure:  Insertion    Small stab incision was made in arm: yes      Left/right:  Left    Preloaded contraceptive capsule trocar was placed subdermally: yes      Visualization of implant was obtained: yes      Contraceptive capsule was inserted and trocar removed: yes      Visualization of notch in stylet and palpation of device: yes      Palpation confirms placement by provider and patient: yes      Site was closed with steri-strips and pressure bandage applied: yes            Assessment/Plan   Diagnoses and all orders for this visit:  Nexplanon insertion  -     lidocaine PF (Xylocaine) 10 mg/mL (1 %) injection 20 mg  -     POCT pregnancy, urine manually resulted  -     etonogestrel-eluting contraceptive implant  -     Human Chorionic Gonadotropin, Serum Quantitative; Future  Postpartum mood disturbance  -     sertraline (Zoloft) 50 mg tablet; Take 1 tablet (50 mg) by mouth once daily.  Congenital branchial cleft cyst  -     amoxicillin-pot clavulanate (Augmentin) 875-125 mg tablet; Take 1 tablet by mouth 2 times a day for 10 days.  Postpartum exam  Other orders  -     Insertion of Contraceptive Capsule      See me yearly   MD Daly Alford, [facility] 03/04/24 9:49 AM

## 2024-03-07 ENCOUNTER — ANESTHESIA (OUTPATIENT)
Dept: OPERATING ROOM | Facility: HOSPITAL | Age: 19
End: 2024-03-07
Payer: COMMERCIAL

## 2024-03-07 ENCOUNTER — HOSPITAL ENCOUNTER (OUTPATIENT)
Facility: HOSPITAL | Age: 19
Setting detail: OUTPATIENT SURGERY
Discharge: HOME | End: 2024-03-07
Attending: OTOLARYNGOLOGY | Admitting: OTOLARYNGOLOGY
Payer: COMMERCIAL

## 2024-03-07 ENCOUNTER — ANESTHESIA EVENT (OUTPATIENT)
Dept: OPERATING ROOM | Facility: HOSPITAL | Age: 19
End: 2024-03-07
Payer: COMMERCIAL

## 2024-03-07 VITALS
HEART RATE: 67 BPM | HEIGHT: 64 IN | BODY MASS INDEX: 42.68 KG/M2 | TEMPERATURE: 97.9 F | RESPIRATION RATE: 17 BRPM | DIASTOLIC BLOOD PRESSURE: 65 MMHG | WEIGHT: 250 LBS | OXYGEN SATURATION: 99 % | SYSTOLIC BLOOD PRESSURE: 126 MMHG

## 2024-03-07 DIAGNOSIS — Q18.0 CONGENITAL BRANCHIAL CLEFT CYST: ICD-10-CM

## 2024-03-07 DIAGNOSIS — G89.18 ACUTE POSTOPERATIVE PAIN: Primary | ICD-10-CM

## 2024-03-07 LAB — HCG UR QL IA.RAPID: NEGATIVE

## 2024-03-07 PROCEDURE — 2500000001 HC RX 250 WO HCPCS SELF ADMINISTERED DRUGS (ALT 637 FOR MEDICARE OP): Performed by: ANESTHESIOLOGY

## 2024-03-07 PROCEDURE — 2500000005 HC RX 250 GENERAL PHARMACY W/O HCPCS: Performed by: NURSE ANESTHETIST, CERTIFIED REGISTERED

## 2024-03-07 PROCEDURE — 88305 TISSUE EXAM BY PATHOLOGIST: CPT | Mod: TC,STJLAB | Performed by: OTOLARYNGOLOGY

## 2024-03-07 PROCEDURE — 2500000004 HC RX 250 GENERAL PHARMACY W/ HCPCS (ALT 636 FOR OP/ED): Performed by: ANESTHESIOLOGY

## 2024-03-07 PROCEDURE — 3600000008 HC OR TIME - EACH INCREMENTAL 1 MINUTE - PROCEDURE LEVEL THREE: Performed by: OTOLARYNGOLOGY

## 2024-03-07 PROCEDURE — 2500000004 HC RX 250 GENERAL PHARMACY W/ HCPCS (ALT 636 FOR OP/ED): Performed by: NURSE ANESTHETIST, CERTIFIED REGISTERED

## 2024-03-07 PROCEDURE — 81025 URINE PREGNANCY TEST: CPT | Performed by: OTOLARYNGOLOGY

## 2024-03-07 PROCEDURE — 2720000007 HC OR 272 NO HCPCS: Performed by: OTOLARYNGOLOGY

## 2024-03-07 PROCEDURE — 7100000009 HC PHASE TWO TIME - INITIAL BASE CHARGE: Performed by: OTOLARYNGOLOGY

## 2024-03-07 PROCEDURE — A21556 PR EXC TUMOR SOFT TISSUE NECK/ANT THORAX SUBFASCIAL <5CM: Performed by: ANESTHESIOLOGY

## 2024-03-07 PROCEDURE — 7100000001 HC RECOVERY ROOM TIME - INITIAL BASE CHARGE: Performed by: OTOLARYNGOLOGY

## 2024-03-07 PROCEDURE — 88305 TISSUE EXAM BY PATHOLOGIST: CPT | Performed by: STUDENT IN AN ORGANIZED HEALTH CARE EDUCATION/TRAINING PROGRAM

## 2024-03-07 PROCEDURE — 2500000005 HC RX 250 GENERAL PHARMACY W/O HCPCS: Performed by: OTOLARYNGOLOGY

## 2024-03-07 PROCEDURE — 7100000010 HC PHASE TWO TIME - EACH INCREMENTAL 1 MINUTE: Performed by: OTOLARYNGOLOGY

## 2024-03-07 PROCEDURE — A21556 PR EXC TUMOR SOFT TISSUE NECK/ANT THORAX SUBFASCIAL <5CM: Performed by: NURSE ANESTHETIST, CERTIFIED REGISTERED

## 2024-03-07 PROCEDURE — 3700000001 HC GENERAL ANESTHESIA TIME - INITIAL BASE CHARGE: Performed by: OTOLARYNGOLOGY

## 2024-03-07 PROCEDURE — 3600000003 HC OR TIME - INITIAL BASE CHARGE - PROCEDURE LEVEL THREE: Performed by: OTOLARYNGOLOGY

## 2024-03-07 PROCEDURE — 7100000002 HC RECOVERY ROOM TIME - EACH INCREMENTAL 1 MINUTE: Performed by: OTOLARYNGOLOGY

## 2024-03-07 PROCEDURE — 21556 EXC NECK TUM DEEP < 5 CM: CPT | Performed by: OTOLARYNGOLOGY

## 2024-03-07 PROCEDURE — 3700000002 HC GENERAL ANESTHESIA TIME - EACH INCREMENTAL 1 MINUTE: Performed by: OTOLARYNGOLOGY

## 2024-03-07 RX ORDER — OXYCODONE HYDROCHLORIDE 5 MG/1
5 TABLET ORAL EVERY 4 HOURS PRN
Status: DISCONTINUED | OUTPATIENT
Start: 2024-03-07 | End: 2024-03-07 | Stop reason: HOSPADM

## 2024-03-07 RX ORDER — LABETALOL HYDROCHLORIDE 5 MG/ML
5 INJECTION, SOLUTION INTRAVENOUS ONCE AS NEEDED
Status: DISCONTINUED | OUTPATIENT
Start: 2024-03-07 | End: 2024-03-07 | Stop reason: HOSPADM

## 2024-03-07 RX ORDER — SODIUM CHLORIDE, SODIUM LACTATE, POTASSIUM CHLORIDE, CALCIUM CHLORIDE 600; 310; 30; 20 MG/100ML; MG/100ML; MG/100ML; MG/100ML
INJECTION, SOLUTION INTRAVENOUS CONTINUOUS PRN
Status: DISCONTINUED | OUTPATIENT
Start: 2024-03-07 | End: 2024-03-07

## 2024-03-07 RX ORDER — LIDOCAINE HYDROCHLORIDE AND EPINEPHRINE 10; 10 MG/ML; UG/ML
INJECTION, SOLUTION INFILTRATION; PERINEURAL AS NEEDED
Status: DISCONTINUED | OUTPATIENT
Start: 2024-03-07 | End: 2024-03-07 | Stop reason: HOSPADM

## 2024-03-07 RX ORDER — SODIUM CHLORIDE, SODIUM LACTATE, POTASSIUM CHLORIDE, CALCIUM CHLORIDE 600; 310; 30; 20 MG/100ML; MG/100ML; MG/100ML; MG/100ML
100 INJECTION, SOLUTION INTRAVENOUS CONTINUOUS
Status: DISCONTINUED | OUTPATIENT
Start: 2024-03-07 | End: 2024-03-07 | Stop reason: HOSPADM

## 2024-03-07 RX ORDER — MIDAZOLAM HYDROCHLORIDE 1 MG/ML
INJECTION, SOLUTION INTRAMUSCULAR; INTRAVENOUS AS NEEDED
Status: DISCONTINUED | OUTPATIENT
Start: 2024-03-07 | End: 2024-03-07

## 2024-03-07 RX ORDER — FENTANYL CITRATE 50 UG/ML
INJECTION, SOLUTION INTRAMUSCULAR; INTRAVENOUS AS NEEDED
Status: DISCONTINUED | OUTPATIENT
Start: 2024-03-07 | End: 2024-03-07

## 2024-03-07 RX ORDER — ONDANSETRON HYDROCHLORIDE 2 MG/ML
4 INJECTION, SOLUTION INTRAVENOUS ONCE AS NEEDED
Status: COMPLETED | OUTPATIENT
Start: 2024-03-07 | End: 2024-03-07

## 2024-03-07 RX ORDER — HYDROMORPHONE HYDROCHLORIDE 1 MG/ML
0.2 INJECTION, SOLUTION INTRAMUSCULAR; INTRAVENOUS; SUBCUTANEOUS EVERY 5 MIN PRN
Status: DISCONTINUED | OUTPATIENT
Start: 2024-03-07 | End: 2024-03-07 | Stop reason: HOSPADM

## 2024-03-07 RX ORDER — HYDROMORPHONE HYDROCHLORIDE 1 MG/ML
0.5 INJECTION, SOLUTION INTRAMUSCULAR; INTRAVENOUS; SUBCUTANEOUS EVERY 5 MIN PRN
Status: DISCONTINUED | OUTPATIENT
Start: 2024-03-07 | End: 2024-03-07 | Stop reason: HOSPADM

## 2024-03-07 RX ORDER — ACETAMINOPHEN 325 MG/1
975 TABLET ORAL ONCE
Status: DISCONTINUED | OUTPATIENT
Start: 2024-03-07 | End: 2024-03-07 | Stop reason: HOSPADM

## 2024-03-07 RX ORDER — ONDANSETRON HYDROCHLORIDE 2 MG/ML
INJECTION, SOLUTION INTRAVENOUS AS NEEDED
Status: DISCONTINUED | OUTPATIENT
Start: 2024-03-07 | End: 2024-03-07

## 2024-03-07 RX ORDER — LIDOCAINE HYDROCHLORIDE 10 MG/ML
0.1 INJECTION, SOLUTION EPIDURAL; INFILTRATION; INTRACAUDAL; PERINEURAL ONCE
Status: DISCONTINUED | OUTPATIENT
Start: 2024-03-07 | End: 2024-03-07 | Stop reason: HOSPADM

## 2024-03-07 RX ORDER — PROPOFOL 10 MG/ML
INJECTION, EMULSION INTRAVENOUS AS NEEDED
Status: DISCONTINUED | OUTPATIENT
Start: 2024-03-07 | End: 2024-03-07

## 2024-03-07 RX ORDER — SCOLOPAMINE TRANSDERMAL SYSTEM 1 MG/1
PATCH, EXTENDED RELEASE TRANSDERMAL AS NEEDED
Status: DISCONTINUED | OUTPATIENT
Start: 2024-03-07 | End: 2024-03-07

## 2024-03-07 RX ORDER — DEXAMETHASONE SODIUM PHOSPHATE 4 MG/ML
INJECTION, SOLUTION INTRA-ARTICULAR; INTRALESIONAL; INTRAMUSCULAR; INTRAVENOUS; SOFT TISSUE AS NEEDED
Status: DISCONTINUED | OUTPATIENT
Start: 2024-03-07 | End: 2024-03-07

## 2024-03-07 RX ORDER — HYDRALAZINE HYDROCHLORIDE 20 MG/ML
5 INJECTION INTRAMUSCULAR; INTRAVENOUS EVERY 30 MIN PRN
Status: DISCONTINUED | OUTPATIENT
Start: 2024-03-07 | End: 2024-03-07 | Stop reason: HOSPADM

## 2024-03-07 RX ORDER — TRAMADOL HYDROCHLORIDE 50 MG/1
50 TABLET ORAL EVERY 6 HOURS PRN
Qty: 12 TABLET | Refills: 0 | Status: SHIPPED | OUTPATIENT
Start: 2024-03-07 | End: 2024-03-10

## 2024-03-07 RX ORDER — DIPHENHYDRAMINE HYDROCHLORIDE 50 MG/ML
12.5 INJECTION INTRAMUSCULAR; INTRAVENOUS ONCE AS NEEDED
Status: DISCONTINUED | OUTPATIENT
Start: 2024-03-07 | End: 2024-03-07 | Stop reason: HOSPADM

## 2024-03-07 RX ORDER — HYDROMORPHONE HYDROCHLORIDE 1 MG/ML
INJECTION, SOLUTION INTRAMUSCULAR; INTRAVENOUS; SUBCUTANEOUS AS NEEDED
Status: DISCONTINUED | OUTPATIENT
Start: 2024-03-07 | End: 2024-03-07

## 2024-03-07 RX ORDER — CEFAZOLIN SODIUM 2 G/100ML
INJECTION, SOLUTION INTRAVENOUS AS NEEDED
Status: DISCONTINUED | OUTPATIENT
Start: 2024-03-07 | End: 2024-03-07

## 2024-03-07 RX ORDER — OXYCODONE AND ACETAMINOPHEN 5; 325 MG/1; MG/1
1 TABLET ORAL EVERY 4 HOURS PRN
Status: DISCONTINUED | OUTPATIENT
Start: 2024-03-07 | End: 2024-03-07 | Stop reason: HOSPADM

## 2024-03-07 RX ORDER — LIDOCAINE HYDROCHLORIDE 20 MG/ML
INJECTION, SOLUTION EPIDURAL; INFILTRATION; INTRACAUDAL; PERINEURAL AS NEEDED
Status: DISCONTINUED | OUTPATIENT
Start: 2024-03-07 | End: 2024-03-07

## 2024-03-07 RX ORDER — ALBUTEROL SULFATE 0.83 MG/ML
2.5 SOLUTION RESPIRATORY (INHALATION) ONCE AS NEEDED
Status: DISCONTINUED | OUTPATIENT
Start: 2024-03-07 | End: 2024-03-07 | Stop reason: HOSPADM

## 2024-03-07 RX ADMIN — HYDROMORPHONE HYDROCHLORIDE 0.5 MG: 1 INJECTION, SOLUTION INTRAMUSCULAR; INTRAVENOUS; SUBCUTANEOUS at 10:22

## 2024-03-07 RX ADMIN — SCOLOPAMINE TRANSDERMAL SYSTEM 1 PATCH: 1 PATCH, EXTENDED RELEASE TRANSDERMAL at 07:30

## 2024-03-07 RX ADMIN — LIDOCAINE HYDROCHLORIDE 100 MG: 20 INJECTION, SOLUTION EPIDURAL; INFILTRATION; INTRACAUDAL; PERINEURAL at 07:35

## 2024-03-07 RX ADMIN — ONDANSETRON 4 MG: 2 INJECTION INTRAMUSCULAR; INTRAVENOUS at 10:24

## 2024-03-07 RX ADMIN — PROPOFOL 200 MG: 10 INJECTION, EMULSION INTRAVENOUS at 07:35

## 2024-03-07 RX ADMIN — MIDAZOLAM 2 MG: 1 INJECTION INTRAMUSCULAR; INTRAVENOUS at 07:30

## 2024-03-07 RX ADMIN — DEXAMETHASONE SODIUM PHOSPHATE 4 MG: 4 INJECTION INTRA-ARTICULAR; INTRALESIONAL; INTRAMUSCULAR; INTRAVENOUS; SOFT TISSUE at 07:43

## 2024-03-07 RX ADMIN — FENTANYL CITRATE 50 MCG: 50 INJECTION, SOLUTION INTRAMUSCULAR; INTRAVENOUS at 07:40

## 2024-03-07 RX ADMIN — SODIUM CHLORIDE, POTASSIUM CHLORIDE, SODIUM LACTATE AND CALCIUM CHLORIDE: 600; 310; 30; 20 INJECTION, SOLUTION INTRAVENOUS at 07:30

## 2024-03-07 RX ADMIN — HYDROMORPHONE HYDROCHLORIDE 0.5 MG: 1 INJECTION, SOLUTION INTRAMUSCULAR; INTRAVENOUS; SUBCUTANEOUS at 09:54

## 2024-03-07 RX ADMIN — ONDANSETRON 4 MG: 2 INJECTION, SOLUTION INTRAMUSCULAR; INTRAVENOUS at 07:45

## 2024-03-07 RX ADMIN — HYDROMORPHONE HYDROCHLORIDE 0.5 MG: 1 INJECTION, SOLUTION INTRAMUSCULAR; INTRAVENOUS; SUBCUTANEOUS at 08:03

## 2024-03-07 RX ADMIN — CEFAZOLIN SODIUM 2 G: 2 INJECTION, SOLUTION INTRAVENOUS at 07:40

## 2024-03-07 RX ADMIN — OXYCODONE HYDROCHLORIDE 5 MG: 5 TABLET ORAL at 11:54

## 2024-03-07 RX ADMIN — FENTANYL CITRATE 50 MCG: 50 INJECTION, SOLUTION INTRAMUSCULAR; INTRAVENOUS at 07:34

## 2024-03-07 SDOH — HEALTH STABILITY: MENTAL HEALTH: CURRENT SMOKER: 0

## 2024-03-07 ASSESSMENT — PAIN SCALES - GENERAL
PAINLEVEL_OUTOF10: 7
PAINLEVEL_OUTOF10: 0 - NO PAIN
PAINLEVEL_OUTOF10: 2
PAINLEVEL_OUTOF10: 7
PAINLEVEL_OUTOF10: 2
PAINLEVEL_OUTOF10: 3

## 2024-03-07 ASSESSMENT — PAIN DESCRIPTION - ORIENTATION
ORIENTATION: RIGHT

## 2024-03-07 ASSESSMENT — PAIN - FUNCTIONAL ASSESSMENT
PAIN_FUNCTIONAL_ASSESSMENT: 0-10

## 2024-03-07 ASSESSMENT — PAIN DESCRIPTION - LOCATION
LOCATION: NECK

## 2024-03-07 ASSESSMENT — COLUMBIA-SUICIDE SEVERITY RATING SCALE - C-SSRS
2. HAVE YOU ACTUALLY HAD ANY THOUGHTS OF KILLING YOURSELF?: NO
1. IN THE PAST MONTH, HAVE YOU WISHED YOU WERE DEAD OR WISHED YOU COULD GO TO SLEEP AND NOT WAKE UP?: NO
6. HAVE YOU EVER DONE ANYTHING, STARTED TO DO ANYTHING, OR PREPARED TO DO ANYTHING TO END YOUR LIFE?: NO

## 2024-03-07 NOTE — ANESTHESIA POSTPROCEDURE EVALUATION
Patient: Kary Crooks    Procedure Summary       Date: 03/07/24 Room / Location: [facility] OR 08 / Virtual [facility] OR    Anesthesia Start: 0730 Anesthesia Stop: 0922    Procedure: Excision Cyst Head/Neck (Right) Diagnosis:       Congenital branchial cleft cyst      (Congenital branchial cleft cyst [Q18.0])    Surgeons: Terrance Christy MD Responsible Provider: Kendrick Lopez MD    Anesthesia Type: general ASA Status: 2            Anesthesia Type: general    Vitals Value Taken Time   /63 03/07/24 0930   Temp 36.3 °C (97.3 °F) 03/07/24 0919   Pulse 64 03/07/24 0938   Resp 14 03/07/24 0938   SpO2 100 % 03/07/24 0938   Vitals shown include unvalidated device data.    Anesthesia Post Evaluation    Patient location during evaluation: PACU  Patient participation: complete - patient participated  Level of consciousness: awake and alert  Pain management: satisfactory to patient  Airway patency: patent  Cardiovascular status: acceptable  Respiratory status: acceptable  Hydration status: acceptable  Postoperative Nausea and Vomiting: none        No notable events documented.

## 2024-03-07 NOTE — DISCHARGE INSTRUCTIONS
-Your incision is covered with Steri-Strips.  These typically fall off on their own within 7 days.  If they are still on after 7 days you can gently soak the Steri-Strips with a wet washcloth and remove.  You may notice part of the suture sticking out the ends of the incision.  This will be trimmed at your follow-up visit.  -Watch for swelling, redness, drainage, increasing pain.  Contact office with any issues  -You can take Tylenol and ibuprofen for pain.  I also sent a small prescription for pain medication to the pharmacy

## 2024-03-07 NOTE — ANESTHESIA PROCEDURE NOTES
Airway  Date/Time: 3/7/2024 7:36 AM  Urgency: elective      Staffing  Performed: CRNA   Authorized by: Kendrick Lopez MD    Performed by: SANTANA Bearden-CRNA  Patient location during procedure: OR    Indications and Patient Condition  Indications for airway management: anesthesia  Spontaneous ventilation: present  Sedation level: deep  Patient position: sniffing  MILS maintained throughout  Mask difficulty assessment: 1 - vent by mask  Planned trial extubation    Final Airway Details  Final airway type: endotracheal airway      Successful airway: ETT     Successful intubation technique: direct laryngoscopy  Blade: Enmanuel  Blade size: #3  ETT size (mm): 7.0  Cormack-Lehane Classification: grade I - full view of glottis  Placement verified by: capnometry   Measured from: gums  ETT to gums (cm): 21

## 2024-03-07 NOTE — ANESTHESIA PREPROCEDURE EVALUATION
Patient: Kary Crooks    Procedure Information       Date/Time: 03/07/24 0730    Procedure: Excision Cyst Head/Neck (Right)    Location: [facility] OR 08 / [facility] OR    Surgeons: Terrance Christy MD            Relevant Problems   Anesthesia   (+) PONV (postoperative nausea and vomiting)      Cardiovascular   (+) Mild pre-eclampsia in third trimester       Clinical information reviewed:   Tobacco  Allergies  Meds   Med Hx  Surg Hx  OB Status  Fam Hx  Soc   Hx        NPO Detail:  NPO/Void Status  Carbohydrate Drink Given Prior to Surgery? : N  Date of Last Liquid: 03/07/24  Time of Last Liquid: 0400  Date of Last Solid: 03/06/24  Time of Last Solid: 2000  Last Intake Type: Clear fluids  Time of Last Void: 0645         Physical Exam    Airway  Mallampati: II  TM distance: >3 FB  Neck ROM: full     Cardiovascular   Rhythm: regular  Rate: normal     Dental - normal exam     Pulmonary   Breath sounds clear to auscultation     Abdominal            Anesthesia Plan    History of general anesthesia?: yes  History of complications of general anesthesia?: yes    ASA 2     general     The patient is not a current smoker.    intravenous induction   Postoperative administration of opioids is intended.  Anesthetic plan and risks discussed with patient.    Plan discussed with CRNA.

## 2024-03-07 NOTE — OP NOTE
Excision Cyst Head/Neck (R) Operative Note     Date: 3/7/2024  OR Location: [facility] OR    Name: Kary Crooks, : 2005, Age: 18 y.o., MRN: [id number], Sex: female    Diagnosis  Pre-op Diagnosis     * Congenital branchial cleft cyst [Q18.0] Post-op Diagnosis     * Congenital branchial cleft cyst [Q18.0]     Procedures  Excision Cyst Head/Neck  75172 - HI EXC TUMOR SOFT TISS NECK/THORAX SUBFASCIAL <5CM      Surgeons      * Terrance Christy - Primary    Resident/Fellow/Other Assistant:  Surgeon(s) and Role:    Procedure Summary  Anesthesia: General  ASA: II  Anesthesia Staff: Anesthesiologist: Kendrick Lopez MD  CRNA: SANTANA Bearden-CRNA  Estimated Blood Loss: 10mL  Intra-op Medications:   Administrations occurring from 0730 to 0930 on 24:   Medication Name Total Dose   lidocaine-epinephrine (Xylocaine W/EPI) 1 %-1:100,000 injection 8 mL              Anesthesia Record               Intraprocedure I/O Totals          Intake    LR infusion 600.00 mL    Total Intake 600 mL          Specimen:   ID Type Source Tests Collected by Time   1 : RIGHT NECK MASS Tissue SOFT TISSUE MASS RESECTION SURGICAL PATHOLOGY EXAM Terrance Christy MD 3/7/2024 0848        Staff:   Circulator: Senia Arzola RN; Alexa Viera RN  Scrub Person: Hannah Harvey        Findings: Right branchial cleft cyst removed intact    Indications: Kary Crooks is an 18 y.o. 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  Patient had 2 prior infections that required drainage and scar tissue from prior attempt at removal    The patient was seen in the preoperative area. The risks, benefits, complications, treatment options, non-operative alternatives, expected recovery and outcomes were discussed with the patient. The possibilities of reaction to medication, pulmonary aspiration, injury to surrounding structures, bleeding, recurrent infection, the need for additional procedures, failure to diagnose a  condition, and creating a complication requiring transfusion or operation were discussed with the patient. The patient concurred with the proposed plan, giving informed consent.  The site of surgery was properly noted/marked if necessary per policy. The patient has been actively warmed in preoperative area. Preoperative antibiotics have been ordered and given within 1 hours of incision. Venous thrombosis prophylaxis have been ordered including bilateral sequential compression devices    Procedure Details: Patient was taken back to the operating room and laid supine on the table.  Timeout was performed, general anesthesia induced, patient was intubated.  The table was turned 90 degrees and the right neck was exposed.  The skin was injected with local anesthesia.  The neck was then prepped and draped in sterile fashion.  An elliptical incision was made over the old scar tissue to remove the scar from the prior incision.  Bovie cautery was then used to dissect through the subcutaneous tissue back to the sternocleidomastoid muscle posteriorly.  Bovie cautery was also used to dissect anteriorly through the platysma down to level 2 of the neck.  The cyst wall could be visualized facial vein, jugular vein, and spinal accessory nerve.  A portion of the cyst was also scarred to the marginal branch of the facial nerve which was identified and preserved.  The nerve stimulated after excision.  Next blunt dissection was used to remove the cyst wall from the surrounding structures.  There was no obvious tract going towards the pharynx however any soft tissue attachments were clipped and divided to decrease likelihood of recurrence.  The specimen was then removed and sent for pathology.  The wound was copiously irrigated and hemostasis was achieved.  Fibrillar was placed in the wound bed.  The incision was then closed in layers using 3-0 Vicryl for the deep layer 4-0 Monocryl for the skin.  Steri-Strips were placed over the  incision.  She was extubated without issue and taken to PACU in stable condition  Complications:  None; patient tolerated the procedure well.    Disposition: PACU - hemodynamically stable.  Condition: stable         Terrance Christy  Phone Number: [phone]

## 2024-03-08 ENCOUNTER — OFFICE VISIT (OUTPATIENT)
Dept: OTOLARYNGOLOGY | Facility: CLINIC | Age: 19
End: 2024-03-08
Payer: COMMERCIAL

## 2024-03-08 VITALS — BODY MASS INDEX: 43.77 KG/M2 | WEIGHT: 255 LBS

## 2024-03-08 DIAGNOSIS — Q18.0 CONGENITAL BRANCHIAL CLEFT CYST: Primary | ICD-10-CM

## 2024-03-08 PROCEDURE — 99024 POSTOP FOLLOW-UP VISIT: CPT | Performed by: OTOLARYNGOLOGY

## 2024-03-08 PROCEDURE — 1036F TOBACCO NON-USER: CPT | Performed by: OTOLARYNGOLOGY

## 2024-03-08 PROCEDURE — 3008F BODY MASS INDEX DOCD: CPT | Performed by: OTOLARYNGOLOGY

## 2024-03-08 ASSESSMENT — PATIENT HEALTH QUESTIONNAIRE - PHQ9
1. LITTLE INTEREST OR PLEASURE IN DOING THINGS: NOT AT ALL
SUM OF ALL RESPONSES TO PHQ9 QUESTIONS 1 AND 2: 0
2. FEELING DOWN, DEPRESSED OR HOPELESS: NOT AT ALL

## 2024-03-08 NOTE — PROGRESS NOTES
ENT Follow up Visit    History Of Present Illness  Kary Crooks is a 18 y.o. female presents for follow up of a branchial cleft cyst.  She was scheduled for reexcision however procedure was canceled the day of the procedure as she found out she was pregnant.  She is due this coming January.      11/13/23: Patient admitted after last visit and taken to the OR for I&D. Cultures negative. Penrose drain left in place. Here for drain removal. Symptoms much improved    2/26/24: returns for follow up. Recently had baby and wants to move forward with excision. No pain but has noticed some swelling over the last week. Had a cold last week    3/8/24: Patient returns for follow-up.  Underwent excision of cyst yesterday.  Drain was not placed.  She noticed some increased swelling today     Past Medical History  She has a past medical history of H/O removal of neck cyst and PONV (postoperative nausea and vomiting).  Patient Active Problem List   Diagnosis    Congenital branchial cleft cyst    PONV (postoperative nausea and vomiting)    BMI 45.0-49.9, adult (CMS/[facility])    Term pregnancy    Arrested active phase of labor    Failed induction of labor    Mild pre-eclampsia in third trimester    Other immediate postpartum hemorrhage    Rh negative status during pregnancy in third trimester       Surgical History  I&d neck abscess     Social History  She reports that she has never smoked. She has never used smokeless tobacco. She reports that she does not currently use alcohol. She reports that she does not currently use drugs.    Family History  No family history on file.     Allergies  Patient has no known allergies.     Physical Exam:  Fullness of the right neck with mild tenderness.  No erythema or drainage from the incision    Steri-Strips were removed and verbal consent was obtained for aspiration of possible seroma.  The skin was injected with local anesthesia.  Approximately 15 cc of noninfected seroma was  aspirated  Assessment and Plan  18 y.o. female with history of likely brachial cleft cyst status post excision.  Seroma aspirated today  -Follow-up Monday morning for repeat exam.  Discussed that it may recollect.  Can repeat aspiration at that time.  Advised her to consider reporting to ER with any progressive pain, erythema, or other symptoms    Terrance Christy MD

## 2024-03-11 ENCOUNTER — OFFICE VISIT (OUTPATIENT)
Dept: OTOLARYNGOLOGY | Facility: CLINIC | Age: 19
End: 2024-03-11
Payer: COMMERCIAL

## 2024-03-11 VITALS — HEIGHT: 64 IN | TEMPERATURE: 97.5 F | BODY MASS INDEX: 43.36 KG/M2 | WEIGHT: 254 LBS

## 2024-03-11 DIAGNOSIS — Q18.0 CONGENITAL BRANCHIAL CLEFT CYST: Primary | ICD-10-CM

## 2024-03-11 PROCEDURE — 1036F TOBACCO NON-USER: CPT | Performed by: OTOLARYNGOLOGY

## 2024-03-11 PROCEDURE — 3008F BODY MASS INDEX DOCD: CPT | Performed by: OTOLARYNGOLOGY

## 2024-03-11 PROCEDURE — 99024 POSTOP FOLLOW-UP VISIT: CPT | Performed by: OTOLARYNGOLOGY

## 2024-03-11 NOTE — PROGRESS NOTES
ENT Follow up Visit    History Of Present Illness  Kary Crooks is a 18 y.o. female presents for follow up of a branchial cleft cyst.  She was scheduled for reexcision however procedure was canceled the day of the procedure as she found out she was pregnant.  She is due this coming January.      11/13/23: Patient admitted after last visit and taken to the OR for I&D. Cultures negative. Penrose drain left in place. Here for drain removal. Symptoms much improved    2/26/24: returns for follow up. Recently had baby and wants to move forward with excision. No pain but has noticed some swelling over the last week. Had a cold last week    3/8/24: Patient returns for follow-up.  Underwent excision of cyst yesterday.  Drain was not placed.  She noticed some increased swelling today    3/11/24: Returns again for follow-up.  Noticed some recollection of the fluid over the weekend.     Past Medical History  She has a past medical history of H/O removal of neck cyst and PONV (postoperative nausea and vomiting).  Patient Active Problem List   Diagnosis    Congenital branchial cleft cyst    PONV (postoperative nausea and vomiting)    BMI 45.0-49.9, adult (CMS/[facility])    Term pregnancy    Arrested active phase of labor    Failed induction of labor    Mild pre-eclampsia in third trimester    Other immediate postpartum hemorrhage    Rh negative status during pregnancy in third trimester       Surgical History  I&d neck abscess     Social History  She reports that she has never smoked. She has never used smokeless tobacco. She reports that she does not currently use alcohol. She reports that she does not currently use drugs.    Family History  No family history on file.     Allergies  Patient has no known allergies.     Physical Exam:  Fullness of the right neck with tenderness.  No erythema or drainage from the incision    Steri-Strips were removed and verbal consent was obtained for aspiration of possible seroma.  The skin was  injected with local anesthesia.  Approximately 10 cc of noninfected seroma was aspirated  Assessment and Plan  18 y.o. female with history of likely brachial cleft cyst status post excision.  Seroma aspirated today, less fluid aspirated today than friday    -Will keep in contact with patient throughout the week in the event she needs repeat aspiration    Terrance Christy MD

## 2024-03-13 ENCOUNTER — APPOINTMENT (OUTPATIENT)
Dept: OBSTETRICS AND GYNECOLOGY | Facility: CLINIC | Age: 19
End: 2024-03-13
Payer: COMMERCIAL

## 2024-03-15 ENCOUNTER — OFFICE VISIT (OUTPATIENT)
Dept: OTOLARYNGOLOGY | Facility: CLINIC | Age: 19
End: 2024-03-15
Payer: COMMERCIAL

## 2024-03-15 DIAGNOSIS — Q18.0 CONGENITAL BRANCHIAL CLEFT CYST: Primary | ICD-10-CM

## 2024-03-15 PROCEDURE — 99024 POSTOP FOLLOW-UP VISIT: CPT | Performed by: OTOLARYNGOLOGY

## 2024-03-15 PROCEDURE — 3008F BODY MASS INDEX DOCD: CPT | Performed by: OTOLARYNGOLOGY

## 2024-03-15 PROCEDURE — 1036F TOBACCO NON-USER: CPT | Performed by: OTOLARYNGOLOGY

## 2024-03-15 NOTE — PROGRESS NOTES
ENT Follow up Visit    History Of Present Illness  Kary Crooks is a 18 y.o. female presents for follow up of a branchial cleft cyst.  She was scheduled for reexcision however procedure was canceled the day of the procedure as she found out she was pregnant.  She is due this coming January.      11/13/23: Patient admitted after last visit and taken to the OR for I&D. Cultures negative. Penrose drain left in place. Here for drain removal. Symptoms much improved    2/26/24: returns for follow up. Recently had baby and wants to move forward with excision. No pain but has noticed some swelling over the last week. Had a cold last week    3/8/24: Patient returns for follow-up.  Underwent excision of cyst yesterday.  Drain was not placed.  She noticed some increased swelling today    3/11/24: Returns again for follow-up.  Noticed some recollection of the fluid over the weekend.    3/15/24: Pain has resolved and swelling has gone down.  She states that is feeling better than prior appointments     Past Medical History  She has a past medical history of H/O removal of neck cyst and PONV (postoperative nausea and vomiting).  Patient Active Problem List   Diagnosis    Congenital branchial cleft cyst    PONV (postoperative nausea and vomiting)    BMI 45.0-49.9, adult (CMS/[facility])    Term pregnancy    Arrested active phase of labor    Failed induction of labor    Mild pre-eclampsia in third trimester    Other immediate postpartum hemorrhage    Rh negative status during pregnancy in third trimester       Surgical History  I&d neck abscess     Social History  She reports that she has never smoked. She has never used smokeless tobacco. She reports that she does not currently use alcohol. She reports that she does not currently use drugs.    Family History  No family history on file.     Allergies  Patient has no known allergies.     Physical Exam:  Less fullness of the right neck, nontender.  No erythema or drainage from the  incision    Assessment and Plan  18 y.o. female with history of likely brachial cleft cyst status post excision with postoperative seroma that was aspirated twice previously.  Discussed option for repeat aspiration today versus close observation and she would like to just watch it since it is improved    -She has my contact information and will reach out to me if she needs an appointment next week.  Advised her to watch for swelling, redness, drainage, worsening pain    Terrance Christy MD

## 2024-03-20 LAB
LABORATORY COMMENT REPORT: NORMAL
PATH REPORT.FINAL DX SPEC: NORMAL
PATH REPORT.GROSS SPEC: NORMAL
PATH REPORT.RELEVANT HX SPEC: NORMAL
PATH REPORT.TOTAL CANCER: NORMAL

## 2024-06-04 ENCOUNTER — TELEPHONE (OUTPATIENT)
Dept: OBSTETRICS AND GYNECOLOGY | Facility: CLINIC | Age: 19
End: 2024-06-04
Payer: COMMERCIAL

## 2024-06-04 NOTE — TELEPHONE ENCOUNTER
----- Message from Shiela Joyner MA sent at 5/31/2024  3:05 PM EDT -----  Regarding: FW: Birth control   Contact: [phone]    ----- Message -----  From: Kary Crooks  Sent: 5/31/2024   2:39 PM EDT  To:  [address] Clinical Support Staff  Subject: Birth control                                    Hey, I’ve been so crabby lately . My weights is flying high . I just don’t feel myself at all and I wanted to make an appointment to take out the birth control and try a different plan.

## 2024-06-25 ENCOUNTER — APPOINTMENT (OUTPATIENT)
Dept: OBSTETRICS AND GYNECOLOGY | Facility: CLINIC | Age: 19
End: 2024-06-25
Payer: COMMERCIAL

## 2024-08-01 ENCOUNTER — APPOINTMENT (OUTPATIENT)
Dept: OBSTETRICS AND GYNECOLOGY | Facility: CLINIC | Age: 19
End: 2024-08-01
Payer: COMMERCIAL

## 2025-05-27 ENCOUNTER — HOSPITAL ENCOUNTER (EMERGENCY)
Facility: HOSPITAL | Age: 20
Discharge: HOME | End: 2025-05-27
Payer: COMMERCIAL

## 2025-05-27 VITALS
WEIGHT: 270 LBS | HEART RATE: 61 BPM | TEMPERATURE: 97.7 F | DIASTOLIC BLOOD PRESSURE: 67 MMHG | SYSTOLIC BLOOD PRESSURE: 126 MMHG | RESPIRATION RATE: 18 BRPM | OXYGEN SATURATION: 98 % | HEIGHT: 64 IN | BODY MASS INDEX: 46.1 KG/M2

## 2025-05-27 DIAGNOSIS — R19.7 NAUSEA VOMITING AND DIARRHEA: Primary | ICD-10-CM

## 2025-05-27 DIAGNOSIS — R11.2 NAUSEA VOMITING AND DIARRHEA: Primary | ICD-10-CM

## 2025-05-27 LAB
ALBUMIN SERPL BCP-MCNC: 4.3 G/DL (ref 3.4–5)
ALP SERPL-CCNC: 76 U/L (ref 33–110)
ALT SERPL W P-5'-P-CCNC: 18 U/L (ref 7–45)
ANION GAP SERPL CALC-SCNC: 14 MMOL/L (ref 10–20)
APPEARANCE UR: CLEAR
AST SERPL W P-5'-P-CCNC: 16 U/L (ref 9–39)
BACTERIA #/AREA URNS AUTO: ABNORMAL /HPF
BASOPHILS # BLD AUTO: 0.02 X10*3/UL (ref 0–0.1)
BASOPHILS NFR BLD AUTO: 0.2 %
BILIRUB SERPL-MCNC: 1.1 MG/DL (ref 0–1.2)
BILIRUB UR STRIP.AUTO-MCNC: NEGATIVE MG/DL
BUN SERPL-MCNC: 13 MG/DL (ref 6–23)
CALCIUM SERPL-MCNC: 9.2 MG/DL (ref 8.6–10.3)
CHLORIDE SERPL-SCNC: 105 MMOL/L (ref 98–107)
CO2 SERPL-SCNC: 24 MMOL/L (ref 21–32)
COLOR UR: YELLOW
CREAT SERPL-MCNC: 0.57 MG/DL (ref 0.5–1.05)
EGFRCR SERPLBLD CKD-EPI 2021: >90 ML/MIN/1.73M*2
EOSINOPHIL # BLD AUTO: 0 X10*3/UL (ref 0–0.7)
EOSINOPHIL NFR BLD AUTO: 0 %
ERYTHROCYTE [DISTWIDTH] IN BLOOD BY AUTOMATED COUNT: 13.2 % (ref 11.5–14.5)
FLUAV RNA RESP QL NAA+PROBE: NOT DETECTED
FLUBV RNA RESP QL NAA+PROBE: NOT DETECTED
GLUCOSE SERPL-MCNC: 131 MG/DL (ref 74–99)
GLUCOSE UR STRIP.AUTO-MCNC: NORMAL MG/DL
HCG UR QL IA.RAPID: NEGATIVE
HCT VFR BLD AUTO: 41.6 % (ref 36–46)
HGB BLD-MCNC: 13.8 G/DL (ref 12–16)
HOLD SPECIMEN: NORMAL
IMM GRANULOCYTES # BLD AUTO: 0.02 X10*3/UL (ref 0–0.7)
IMM GRANULOCYTES NFR BLD AUTO: 0.2 % (ref 0–0.9)
KETONES UR STRIP.AUTO-MCNC: ABNORMAL MG/DL
LEUKOCYTE ESTERASE UR QL STRIP.AUTO: NEGATIVE
LIPASE SERPL-CCNC: 17 U/L (ref 9–82)
LYMPHOCYTES # BLD AUTO: 0.55 X10*3/UL (ref 1.2–4.8)
LYMPHOCYTES NFR BLD AUTO: 4.9 %
MAGNESIUM SERPL-MCNC: 2.08 MG/DL (ref 1.6–2.4)
MCH RBC QN AUTO: 25.9 PG (ref 26–34)
MCHC RBC AUTO-ENTMCNC: 33.2 G/DL (ref 32–36)
MCV RBC AUTO: 78 FL (ref 80–100)
MONOCYTES # BLD AUTO: 0.27 X10*3/UL (ref 0.1–1)
MONOCYTES NFR BLD AUTO: 2.4 %
MUCOUS THREADS #/AREA URNS AUTO: ABNORMAL /LPF
NEUTROPHILS # BLD AUTO: 10.3 X10*3/UL (ref 1.2–7.7)
NEUTROPHILS NFR BLD AUTO: 92.3 %
NITRITE UR QL STRIP.AUTO: NEGATIVE
NRBC BLD-RTO: 0 /100 WBCS (ref 0–0)
PH UR STRIP.AUTO: 6.5 [PH]
PLATELET # BLD AUTO: 398 X10*3/UL (ref 150–450)
POTASSIUM SERPL-SCNC: 3.8 MMOL/L (ref 3.5–5.3)
PROT SERPL-MCNC: 7.7 G/DL (ref 6.4–8.2)
PROT UR STRIP.AUTO-MCNC: ABNORMAL MG/DL
RBC # BLD AUTO: 5.33 X10*6/UL (ref 4–5.2)
RBC # UR STRIP.AUTO: NEGATIVE MG/DL
RBC #/AREA URNS AUTO: ABNORMAL /HPF
SARS-COV-2 RNA RESP QL NAA+PROBE: NOT DETECTED
SODIUM SERPL-SCNC: 139 MMOL/L (ref 136–145)
SP GR UR STRIP.AUTO: 1.02
SQUAMOUS #/AREA URNS AUTO: ABNORMAL /HPF
UROBILINOGEN UR STRIP.AUTO-MCNC: NORMAL MG/DL
WBC # BLD AUTO: 11.2 X10*3/UL (ref 4.4–11.3)
WBC #/AREA URNS AUTO: ABNORMAL /HPF

## 2025-05-27 PROCEDURE — 2500000004 HC RX 250 GENERAL PHARMACY W/ HCPCS (ALT 636 FOR OP/ED): Mod: JZ | Performed by: PHYSICIAN ASSISTANT

## 2025-05-27 PROCEDURE — 87636 SARSCOV2 & INF A&B AMP PRB: CPT | Performed by: STUDENT IN AN ORGANIZED HEALTH CARE EDUCATION/TRAINING PROGRAM

## 2025-05-27 PROCEDURE — 99284 EMERGENCY DEPT VISIT MOD MDM: CPT

## 2025-05-27 PROCEDURE — 99284 EMERGENCY DEPT VISIT MOD MDM: CPT | Performed by: PHYSICIAN ASSISTANT

## 2025-05-27 PROCEDURE — 85025 COMPLETE CBC W/AUTO DIFF WBC: CPT | Performed by: PHYSICIAN ASSISTANT

## 2025-05-27 PROCEDURE — 81025 URINE PREGNANCY TEST: CPT | Performed by: PHYSICIAN ASSISTANT

## 2025-05-27 PROCEDURE — 36415 COLL VENOUS BLD VENIPUNCTURE: CPT | Performed by: PHYSICIAN ASSISTANT

## 2025-05-27 PROCEDURE — 96375 TX/PRO/DX INJ NEW DRUG ADDON: CPT

## 2025-05-27 PROCEDURE — 81001 URINALYSIS AUTO W/SCOPE: CPT | Performed by: PHYSICIAN ASSISTANT

## 2025-05-27 PROCEDURE — 96361 HYDRATE IV INFUSION ADD-ON: CPT

## 2025-05-27 PROCEDURE — 80053 COMPREHEN METABOLIC PANEL: CPT | Performed by: PHYSICIAN ASSISTANT

## 2025-05-27 PROCEDURE — 83735 ASSAY OF MAGNESIUM: CPT | Performed by: PHYSICIAN ASSISTANT

## 2025-05-27 PROCEDURE — 83690 ASSAY OF LIPASE: CPT | Performed by: PHYSICIAN ASSISTANT

## 2025-05-27 PROCEDURE — 96374 THER/PROPH/DIAG INJ IV PUSH: CPT

## 2025-05-27 RX ORDER — METOCLOPRAMIDE 5 MG/1
5 TABLET ORAL 4 TIMES DAILY PRN
Qty: 9 TABLET | Refills: 0 | Status: SHIPPED | OUTPATIENT
Start: 2025-05-27 | End: 2025-05-30

## 2025-05-27 RX ORDER — ONDANSETRON 4 MG/1
4 TABLET, FILM COATED ORAL EVERY 8 HOURS PRN
Qty: 9 TABLET | Refills: 0 | Status: SHIPPED | OUTPATIENT
Start: 2025-05-27 | End: 2025-05-30

## 2025-05-27 RX ORDER — METOCLOPRAMIDE HYDROCHLORIDE 5 MG/ML
5 INJECTION INTRAMUSCULAR; INTRAVENOUS ONCE
Status: COMPLETED | OUTPATIENT
Start: 2025-05-27 | End: 2025-05-27

## 2025-05-27 RX ORDER — ONDANSETRON HYDROCHLORIDE 2 MG/ML
4 INJECTION, SOLUTION INTRAVENOUS ONCE
Status: COMPLETED | OUTPATIENT
Start: 2025-05-27 | End: 2025-05-27

## 2025-05-27 RX ADMIN — SODIUM CHLORIDE 1000 ML: 0.9 INJECTION, SOLUTION INTRAVENOUS at 11:41

## 2025-05-27 RX ADMIN — METOCLOPRAMIDE 5 MG: 5 INJECTION, SOLUTION INTRAMUSCULAR; INTRAVENOUS at 13:26

## 2025-05-27 RX ADMIN — ONDANSETRON 4 MG: 2 INJECTION INTRAMUSCULAR; INTRAVENOUS at 11:41

## 2025-05-27 ASSESSMENT — COLUMBIA-SUICIDE SEVERITY RATING SCALE - C-SSRS
1. IN THE PAST MONTH, HAVE YOU WISHED YOU WERE DEAD OR WISHED YOU COULD GO TO SLEEP AND NOT WAKE UP?: NO
6. HAVE YOU EVER DONE ANYTHING, STARTED TO DO ANYTHING, OR PREPARED TO DO ANYTHING TO END YOUR LIFE?: NO
2. HAVE YOU ACTUALLY HAD ANY THOUGHTS OF KILLING YOURSELF?: NO

## 2025-05-27 ASSESSMENT — LIFESTYLE VARIABLES
EVER HAD A DRINK FIRST THING IN THE MORNING TO STEADY YOUR NERVES TO GET RID OF A HANGOVER: NO
HAVE PEOPLE ANNOYED YOU BY CRITICIZING YOUR DRINKING: NO
HAVE YOU EVER FELT YOU SHOULD CUT DOWN ON YOUR DRINKING: NO
TOTAL SCORE: 0
EVER FELT BAD OR GUILTY ABOUT YOUR DRINKING: NO

## 2025-05-27 ASSESSMENT — PAIN - FUNCTIONAL ASSESSMENT: PAIN_FUNCTIONAL_ASSESSMENT: 0-10

## 2025-05-27 ASSESSMENT — PAIN SCALES - GENERAL: PAINLEVEL_OUTOF10: 0 - NO PAIN

## 2025-05-27 NOTE — DISCHARGE INSTRUCTIONS
Please return to the ER or seek immediate medical attention if you experience new or worsening abdominal pain, persistent vomiting, persistent diarrhea, black tar stools, fever of 38C (100.4) or higher, chest pain, shortness of breath, or worsening of your current symptoms.  Or if you have any signs of dehydration such as dry mouth, inability to eat or drink, significant copious diarrhea we are not able to keep up with hydration status    You are welcome back any time. Thank you for entrusting your care to us, I hope we made your visit as pleasant as possible. Wishing you well!    Tayler Nogueira PA-C

## 2025-05-27 NOTE — ED PROVIDER NOTES
Emergency Department Provider Note        History of Present Illness     History provided by: Patient  Limitations to History: None  External Records Reviewed with Brief Summary: None    HPI:  Kary Crooks is a 19 y.o. female who is previously healthy who presents today for evaluation of nausea vomiting diarrhea and lightheadedness as well as decreased appetite, patient states he started having symptoms at 11 AM yesterday and states she has had more than 10 episodes of vomiting and 4 episodes of nonbloody diarrhea in that time.  Patient states she really has not been able to keep much down secondary to vomiting.  Patient did have a hamburger during the weekend but states that everybody else had burgers to and nobody else got sick however her son was sick with a GI bug 2 days ago and fully recovered and she thinks she may have caught something from him.  Denies any recent medication changes, antibiotic use or travel outside the country.  Denies any blood in her vomit or diarrhea.  Denies chest pain or shortness of breath.  She does not believe she is pregnant, has a Nexplanon but is agreeable to be tested.  No burning frequency or urgency with urination.  She does not have anything to take for nausea at home.    Physical Exam   Triage vitals:  T 36.5 °C (97.7 °F)  HR 57  /62  RR 17  O2 96 % None (Room air)    Physical Exam  Vitals and nursing note reviewed.   Constitutional:       General: She is not in acute distress.     Appearance: Normal appearance. She is not toxic-appearing.   HENT:      Head: Normocephalic and atraumatic.      Nose: Nose normal.   Eyes:      Extraocular Movements: Extraocular movements intact.   Cardiovascular:      Rate and Rhythm: Normal rate and regular rhythm.   Pulmonary:      Effort: Pulmonary effort is normal.   Abdominal:      Palpations: Abdomen is soft.      Tenderness: There is no abdominal tenderness. There is no guarding.      Comments: Soft nontender abdomen.    Musculoskeletal:         General: Normal range of motion.      Cervical back: Normal range of motion and neck supple.   Skin:     General: Skin is warm and dry.   Neurological:      General: No focal deficit present.      Mental Status: She is alert.   Psychiatric:         Mood and Affect: Mood normal.         Thought Content: Thought content normal.        No orders to display     Labs Reviewed   CBC WITH AUTO DIFFERENTIAL - Abnormal       Result Value    WBC 11.2      nRBC 0.0      RBC 5.33 (*)     Hemoglobin 13.8      Hematocrit 41.6      MCV 78 (*)     MCH 25.9 (*)     MCHC 33.2      RDW 13.2      Platelets 398      Neutrophils % 92.3      Immature Granulocytes %, Automated 0.2      Lymphocytes % 4.9      Monocytes % 2.4      Eosinophils % 0.0      Basophils % 0.2      Neutrophils Absolute 10.30 (*)     Immature Granulocytes Absolute, Automated 0.02      Lymphocytes Absolute 0.55 (*)     Monocytes Absolute 0.27      Eosinophils Absolute 0.00      Basophils Absolute 0.02     COMPREHENSIVE METABOLIC PANEL - Abnormal    Glucose 131 (*)     Sodium 139      Potassium 3.8      Chloride 105      Bicarbonate 24      Anion Gap 14      Urea Nitrogen 13      Creatinine 0.57      eGFR >90      Calcium 9.2      Albumin 4.3      Alkaline Phosphatase 76      Total Protein 7.7      AST 16      Bilirubin, Total 1.1      ALT 18     URINALYSIS WITH REFLEX CULTURE AND MICROSCOPIC - Abnormal    Color, Urine Yellow      Appearance, Urine Clear      Specific Gravity, Urine 1.022      pH, Urine 6.5      Protein, Urine 10 (TRACE)      Glucose, Urine Normal      Blood, Urine NEGATIVE      Ketones, Urine 10 (1+) (*)     Bilirubin, Urine NEGATIVE      Urobilinogen, Urine Normal      Nitrite, Urine NEGATIVE      Leukocyte Esterase, Urine NEGATIVE     URINALYSIS MICROSCOPIC WITH REFLEX CULTURE - Abnormal    WBC, Urine NONE      RBC, Urine NONE      Squamous Epithelial Cells, Urine 1-9 (SPARSE)      Bacteria, Urine 1+ (*)     Mucus, Urine  FEW     SARS-COV-2 AND INFLUENZA A/B PCR - Normal    Flu A Result Not Detected      Flu B Result Not Detected      Coronavirus 2019, PCR Not Detected      Narrative:     This assay is an FDA-cleared, in vitro diagnostic nucleic acid amplification test for the qualitative detection and differentiation of SARS CoV-2/ Influenza A/B from nasopharyngeal specimens collected from individuals with signs and symptoms of respiratory tract infections, and has been validated for use at [facility]. Negative results do not preclude COVID-19/ Influenza A/B infections and should not be used as the sole basis for diagnosis, treatment, or other management decisions. Testing for SARS CoV-2 is recommended only for patients who meet current clinical and/or epidemiological criteria defined by federal, state, or local public health directives.   MAGNESIUM - Normal    Magnesium 2.08     LIPASE - Normal    Lipase 17      Narrative:     Venipuncture immediately after or during the administration of Metamizole may lead to falsely low results. Testing should be performed immediately prior to Metamizole dosing.   HCG, URINE, QUALITATIVE - Normal    HCG, Urine NEGATIVE     URINALYSIS WITH REFLEX CULTURE AND MICROSCOPIC    Narrative:     The following orders were created for panel order Urinalysis with Reflex Culture and Microscopic.  Procedure                               Abnormality         Status                     ---------                               -----------         ------                     Urinalysis with Reflex C...[[id number]]  Abnormal            Final result               Extra Urine Gray Tube[[id number]]                            In process                   Please view results for these tests on the individual orders.   EXTRA URINE GRAY TUBE     .[facility]  ED Course as of 05/27/25 1921   Tue May 27, 2025   4884 Patient feeling much better, she would like to go home, will be discharged in stable  condition. []      ED Course User Index  [] Tayler Nogueira PA-C         Diagnoses as of 25   Nausea vomiting and diarrhea         Medical Decision Making & ED Course   Medical Decision Makin y.o. female presents today for evaluation of nausea vomiting diarrhea and abdominal pain, has a soft nontender abdomen, I ordered Zofran, fluids and labs.  Do not feel that CT imaging is warranted given absence of tenderness.  Her urinalysis showed a normal specific gravity, negative nitrates and leukocyte esterase with 1+ bacteria however no white blood cells, not concerning for UTI given absence of symptoms, she is not pregnant, lipase magnesium within normal limits, CMP without electrolyte derangements, CBC without leukocytosis or anemia.  On reassessment patient was feeling better but after trying to eat some crackers had recurrence of her nausea at which point I tried Reglan, after Reglan patient was feeling much better was requesting to go home, will be discharged in stable condition with prescriptions for both Zofran and Reglan and advised to take which she feels works better for her.  Return precautions were discussed including inability to tolerate p.o., dehydration, vomiting, new or worsening abdominal pain, fevers etc. she expressed understanding.  ----      Differential diagnoses considered include but are not limited to: Foodborne illness, viral gastroenteritis, influenza, COVID, appendicitis, UTI, pregnancy etc.     Social Determinants of Health which Significantly Impact Care: None identified     EKG Independent Interpretation: EKG not obtained    Independent Result Review and Interpretation: Relevant laboratory and radiographic results were reviewed and independently interpreted by myself.  As necessary, they are commented on in the ED Course.    Chronic conditions affecting the patient's care: As documented above in MDM    The patient was discussed with the following consultants/services:  None    Care Considerations: As documented above in [facility]    ED Course:  ED Course as of 05/27/25 1921 Tue May 27, 2025   1334 Patient feeling much better, she would like to go home, will be discharged in stable condition. [MC]      ED Course User Index  [MC] Tayler Nogueira PA-C         Diagnoses as of 05/27/25 1921   Nausea vomiting and diarrhea     Disposition   As a result of the work-up, the patient was discharged home.  she was informed of her diagnosis and instructed to come back with any concerns or worsening of condition.  she and was agreeable to the plan as discussed above.  she was given the opportunity to ask questions.  All of the patient's questions were answered.    Procedures   Procedures    Patient was seen independently    Tayler Nogueira PA-C  Emergency Medicine       Tayler Nogueira PA-C  05/27/25 1921

## 2025-05-27 NOTE — Clinical Note
Kary Crooks was seen and treated in our emergency department on 5/27/2025.  She may return to work on 05/29/2025.       If you have any questions or concerns, please don't hesitate to call.      Tayler Nogueira PA-C

## (undated) DEVICE — PREP TRAY, SKIN, SCRUB, DISP, STERILE

## (undated) DEVICE — REST, HEAD, BAGEL, 9 IN

## (undated) DEVICE — STAY SET, SURGICAL , 5MM SHARP HOOK, CS/ 50

## (undated) DEVICE — COVER, CART, 45 X 27 X 48 IN, CLEAR

## (undated) DEVICE — Device

## (undated) DEVICE — SUTURE, SILK, 3-0, 30 IN, MULTIPACK, BLACK

## (undated) DEVICE — STAPLER, SKIN, PLUS, WIDE, 35

## (undated) DEVICE — SUTURE, MONOCRYLIC, 4-0, P3, MONO 18

## (undated) DEVICE — SPONGE, LAP, XRAY DECT, 12IN X 12IN, W/MASTER DMT, STERILE

## (undated) DEVICE — TOWEL PACK, STERILE, 4/PACK, BLUE

## (undated) DEVICE — SUTURE, VICRYL, 3-0,18 IN, SH, UNDYED

## (undated) DEVICE — SPONGE, GAUZE, XRAY DECT, 16 PLY, 4 X 4, W/MASTER DMT,STERILE

## (undated) DEVICE — SOLUTION, IRRIGATION, SODIUM CHLORIDE 0.9%, 1000 ML, POUR BOTTLE

## (undated) DEVICE — NEEDLE, ELECTRODE, ELECTROSURGICAL, INSULATED

## (undated) DEVICE — SOLUTION, IRRIGATION, STERILE WATER, 1000 ML, POUR BOTTLE

## (undated) DEVICE — TRAY, MINOR, SINGLE BASIN, STERILE

## (undated) DEVICE — CONTAINER STERILE SPECIMEN 90ML, STERILE

## (undated) DEVICE — SUTURE, VICRYL, 3-0, 18 IN, PS2, UNDYED

## (undated) DEVICE — GLOVE, SURGICAL, PROTEXIS PI , 7.5, PF, LF

## (undated) DEVICE — SPONGE, HEMOSTAT, SURGICEL FIBRILLAR, ABS, 4 X 4, LF

## (undated) DEVICE — MANIFOLD, 4 PORT NEPTUNE STANDARD

## (undated) DEVICE — SUTURE, PLAIN, 5-0, 18 IN, PC1, YELLOW

## (undated) DEVICE — STIMULATOR, NERVE LOCATOR, PORTABLE, 3 OUTPUT SETTING, VARISTEM III

## (undated) DEVICE — SUTURE, SILK, 2-0, TIES, 12-30 IN, BLACK